# Patient Record
Sex: MALE | Race: ASIAN | NOT HISPANIC OR LATINO | Employment: OTHER | URBAN - METROPOLITAN AREA
[De-identification: names, ages, dates, MRNs, and addresses within clinical notes are randomized per-mention and may not be internally consistent; named-entity substitution may affect disease eponyms.]

---

## 2017-06-30 ENCOUNTER — RECORDS - HEALTHEAST (OUTPATIENT)
Dept: LAB | Facility: CLINIC | Age: 62
End: 2017-06-30

## 2017-06-30 LAB
CHOLEST SERPL-MCNC: 163 MG/DL
FASTING STATUS PATIENT QL REPORTED: YES
HDLC SERPL-MCNC: 30 MG/DL
LDLC SERPL CALC-MCNC: 50 MG/DL
LDLC SERPL CALC-MCNC: ABNORMAL MG/DL
PSA SERPL-MCNC: 1.2 NG/ML (ref 0–4.5)
TRIGL SERPL-MCNC: 474 MG/DL

## 2017-10-27 ENCOUNTER — RECORDS - HEALTHEAST (OUTPATIENT)
Dept: LAB | Facility: CLINIC | Age: 62
End: 2017-10-27

## 2017-10-28 LAB
1ST CONSTITUENT:: NORMAL
SOURCE: NORMAL

## 2018-03-08 ENCOUNTER — RECORDS - HEALTHEAST (OUTPATIENT)
Dept: LAB | Facility: CLINIC | Age: 63
End: 2018-03-08

## 2018-03-08 LAB
CHOLEST SERPL-MCNC: 163 MG/DL
FASTING STATUS PATIENT QL REPORTED: ABNORMAL
HDLC SERPL-MCNC: 38 MG/DL
LDLC SERPL CALC-MCNC: 59 MG/DL
TRIGL SERPL-MCNC: 332 MG/DL

## 2018-05-04 ENCOUNTER — RECORDS - HEALTHEAST (OUTPATIENT)
Dept: LAB | Facility: CLINIC | Age: 63
End: 2018-05-04

## 2018-05-04 LAB
FASTING STATUS PATIENT QL REPORTED: NORMAL
GLUCOSE BLD-MCNC: 111 MG/DL (ref 70–125)
PSA SERPL-MCNC: 1.6 NG/ML (ref 0–4.5)
TSH SERPL DL<=0.005 MIU/L-ACNC: 3.65 UIU/ML (ref 0.3–5)

## 2019-05-17 ENCOUNTER — RECORDS - HEALTHEAST (OUTPATIENT)
Dept: LAB | Facility: CLINIC | Age: 64
End: 2019-05-17

## 2019-05-17 LAB
ANION GAP SERPL CALCULATED.3IONS-SCNC: 12 MMOL/L (ref 5–18)
BUN SERPL-MCNC: 12 MG/DL (ref 8–22)
CALCIUM SERPL-MCNC: 9.3 MG/DL (ref 8.5–10.5)
CHLORIDE BLD-SCNC: 106 MMOL/L (ref 98–107)
CHOLEST SERPL-MCNC: 146 MG/DL
CO2 SERPL-SCNC: 25 MMOL/L (ref 22–31)
CREAT SERPL-MCNC: 0.97 MG/DL (ref 0.7–1.3)
FASTING STATUS PATIENT QL REPORTED: ABNORMAL
GFR SERPL CREATININE-BSD FRML MDRD: >60 ML/MIN/1.73M2
GLUCOSE BLD-MCNC: 105 MG/DL (ref 70–125)
HDLC SERPL-MCNC: 37 MG/DL
LDLC SERPL CALC-MCNC: 58 MG/DL
POTASSIUM BLD-SCNC: 4.1 MMOL/L (ref 3.5–5)
PSA SERPL-MCNC: 1.6 NG/ML (ref 0–4.5)
SODIUM SERPL-SCNC: 143 MMOL/L (ref 136–145)
TRIGL SERPL-MCNC: 257 MG/DL

## 2019-05-19 LAB — HBA1C MFR BLD: 6.1 % (ref 4.2–6.1)

## 2020-01-29 ENCOUNTER — RECORDS - HEALTHEAST (OUTPATIENT)
Dept: LAB | Facility: CLINIC | Age: 65
End: 2020-01-29

## 2020-01-29 LAB
ANION GAP SERPL CALCULATED.3IONS-SCNC: 10 MMOL/L (ref 5–18)
BUN SERPL-MCNC: 15 MG/DL (ref 8–22)
CALCIUM SERPL-MCNC: 9.2 MG/DL (ref 8.5–10.5)
CHLORIDE BLD-SCNC: 103 MMOL/L (ref 98–107)
CO2 SERPL-SCNC: 26 MMOL/L (ref 22–31)
CREAT SERPL-MCNC: 1.01 MG/DL (ref 0.7–1.3)
GFR SERPL CREATININE-BSD FRML MDRD: >60 ML/MIN/1.73M2
GLUCOSE BLD-MCNC: 98 MG/DL (ref 70–125)
POTASSIUM BLD-SCNC: 4.4 MMOL/L (ref 3.5–5)
SODIUM SERPL-SCNC: 139 MMOL/L (ref 136–145)

## 2020-01-30 LAB — BACTERIA SPEC CULT: NO GROWTH

## 2020-03-23 ENCOUNTER — RECORDS - HEALTHEAST (OUTPATIENT)
Dept: LAB | Facility: CLINIC | Age: 65
End: 2020-03-23

## 2020-03-23 LAB — INR PPP: 1.04 (ref 0.9–1.1)

## 2020-03-24 ENCOUNTER — RECORDS - HEALTHEAST (OUTPATIENT)
Dept: LAB | Facility: CLINIC | Age: 65
End: 2020-03-24

## 2020-03-24 LAB
BASOPHILS # BLD AUTO: 0 THOU/UL (ref 0–0.2)
BASOPHILS NFR BLD AUTO: 0 % (ref 0–2)
EOSINOPHIL # BLD AUTO: 0.1 THOU/UL (ref 0–0.4)
EOSINOPHIL NFR BLD AUTO: 2 % (ref 0–6)
ERYTHROCYTE [DISTWIDTH] IN BLOOD BY AUTOMATED COUNT: 13.9 % (ref 11–14.5)
HCT VFR BLD AUTO: 46.7 % (ref 40–54)
HGB BLD-MCNC: 14.4 G/DL (ref 14–18)
LYMPHOCYTES # BLD AUTO: 1.4 THOU/UL (ref 0.8–4.4)
LYMPHOCYTES NFR BLD AUTO: 27 % (ref 20–40)
MCH RBC QN AUTO: 28.2 PG (ref 27–34)
MCHC RBC AUTO-ENTMCNC: 30.8 G/DL (ref 32–36)
MCV RBC AUTO: 92 FL (ref 80–100)
MONOCYTES # BLD AUTO: 0.3 THOU/UL (ref 0–0.9)
MONOCYTES NFR BLD AUTO: 5 % (ref 2–10)
NEUTROPHILS # BLD AUTO: 3.3 THOU/UL (ref 2–7.7)
NEUTROPHILS NFR BLD AUTO: 65 % (ref 50–70)
PLATELET # BLD AUTO: 284 THOU/UL (ref 140–440)
PMV BLD AUTO: 10.3 FL (ref 8.5–12.5)
RBC # BLD AUTO: 5.1 MILL/UL (ref 4.4–6.2)
WBC: 5 THOU/UL (ref 4–11)

## 2020-07-08 ENCOUNTER — RECORDS - HEALTHEAST (OUTPATIENT)
Dept: LAB | Facility: CLINIC | Age: 65
End: 2020-07-08

## 2020-07-08 LAB
ANION GAP SERPL CALCULATED.3IONS-SCNC: 10 MMOL/L (ref 5–18)
BUN SERPL-MCNC: 16 MG/DL (ref 8–22)
CALCIUM SERPL-MCNC: 9.3 MG/DL (ref 8.5–10.5)
CHLORIDE BLD-SCNC: 104 MMOL/L (ref 98–107)
CHOLEST SERPL-MCNC: 150 MG/DL
CO2 SERPL-SCNC: 26 MMOL/L (ref 22–31)
CREAT SERPL-MCNC: 0.97 MG/DL (ref 0.7–1.3)
FASTING STATUS PATIENT QL REPORTED: NORMAL
GFR SERPL CREATININE-BSD FRML MDRD: >60 ML/MIN/1.73M2
GLUCOSE BLD-MCNC: 108 MG/DL (ref 70–125)
HDLC SERPL-MCNC: 40 MG/DL
LDLC SERPL CALC-MCNC: 81 MG/DL
POTASSIUM BLD-SCNC: 3.7 MMOL/L (ref 3.5–5)
PSA SERPL-MCNC: 1.2 NG/ML (ref 0–4.5)
SODIUM SERPL-SCNC: 140 MMOL/L (ref 136–145)
TRIGL SERPL-MCNC: 145 MG/DL
TSH SERPL DL<=0.005 MIU/L-ACNC: 3.83 UIU/ML (ref 0.3–5)

## 2021-05-25 ENCOUNTER — RECORDS - HEALTHEAST (OUTPATIENT)
Dept: ADMINISTRATIVE | Facility: CLINIC | Age: 66
End: 2021-05-25

## 2021-05-28 ENCOUNTER — RECORDS - HEALTHEAST (OUTPATIENT)
Dept: ADMINISTRATIVE | Facility: CLINIC | Age: 66
End: 2021-05-28

## 2021-06-02 ENCOUNTER — RECORDS - HEALTHEAST (OUTPATIENT)
Dept: ADMINISTRATIVE | Facility: CLINIC | Age: 66
End: 2021-06-02

## 2021-06-25 ENCOUNTER — RECORDS - HEALTHEAST (OUTPATIENT)
Dept: LAB | Facility: CLINIC | Age: 66
End: 2021-06-25

## 2021-06-25 LAB
ANION GAP SERPL CALCULATED.3IONS-SCNC: 7 MMOL/L (ref 5–18)
BUN SERPL-MCNC: 13 MG/DL (ref 8–22)
CALCIUM SERPL-MCNC: 8.6 MG/DL (ref 8.5–10.5)
CHLORIDE BLD-SCNC: 104 MMOL/L (ref 98–107)
CHOLEST SERPL-MCNC: 139 MG/DL
CO2 SERPL-SCNC: 28 MMOL/L (ref 22–31)
CREAT SERPL-MCNC: 0.96 MG/DL (ref 0.7–1.3)
FASTING STATUS PATIENT QL REPORTED: ABNORMAL
GFR SERPL CREATININE-BSD FRML MDRD: >60 ML/MIN/1.73M2
GLUCOSE BLD-MCNC: 102 MG/DL (ref 70–125)
HDLC SERPL-MCNC: 38 MG/DL
LDLC SERPL CALC-MCNC: 61 MG/DL
POTASSIUM BLD-SCNC: 4.2 MMOL/L (ref 3.5–5)
PSA SERPL-MCNC: 1.3 NG/ML (ref 0–4.5)
SODIUM SERPL-SCNC: 139 MMOL/L (ref 136–145)
TRIGL SERPL-MCNC: 199 MG/DL

## 2022-06-28 ENCOUNTER — LAB REQUISITION (OUTPATIENT)
Dept: LAB | Facility: CLINIC | Age: 67
End: 2022-06-28
Payer: MEDICARE

## 2022-06-28 DIAGNOSIS — E78.5 HYPERLIPIDEMIA, UNSPECIFIED: ICD-10-CM

## 2022-06-28 DIAGNOSIS — Z12.5 ENCOUNTER FOR SCREENING FOR MALIGNANT NEOPLASM OF PROSTATE: ICD-10-CM

## 2022-06-28 LAB
ALBUMIN SERPL-MCNC: 3.8 G/DL (ref 3.5–5)
ALP SERPL-CCNC: 105 U/L (ref 45–120)
ALT SERPL W P-5'-P-CCNC: 44 U/L (ref 0–45)
ANION GAP SERPL CALCULATED.3IONS-SCNC: 11 MMOL/L (ref 5–18)
AST SERPL W P-5'-P-CCNC: 37 U/L (ref 0–40)
BILIRUB SERPL-MCNC: 1.1 MG/DL (ref 0–1)
BUN SERPL-MCNC: 13 MG/DL (ref 8–22)
CALCIUM SERPL-MCNC: 9.1 MG/DL (ref 8.5–10.5)
CHLORIDE BLD-SCNC: 104 MMOL/L (ref 98–107)
CHOLEST SERPL-MCNC: 164 MG/DL
CO2 SERPL-SCNC: 25 MMOL/L (ref 22–31)
CREAT SERPL-MCNC: 0.67 MG/DL (ref 0.7–1.3)
FASTING STATUS PATIENT QL REPORTED: ABNORMAL
GFR SERPL CREATININE-BSD FRML MDRD: >90 ML/MIN/1.73M2
GLUCOSE BLD-MCNC: 110 MG/DL (ref 70–125)
HDLC SERPL-MCNC: 40 MG/DL
LDLC SERPL CALC-MCNC: 64 MG/DL
POTASSIUM BLD-SCNC: 4 MMOL/L (ref 3.5–5)
PROT SERPL-MCNC: 7 G/DL (ref 6–8)
PSA SERPL-MCNC: 1.26 UG/L (ref 0–4.5)
SODIUM SERPL-SCNC: 140 MMOL/L (ref 136–145)
TRIGL SERPL-MCNC: 299 MG/DL

## 2022-06-28 PROCEDURE — 80053 COMPREHEN METABOLIC PANEL: CPT | Mod: ORL | Performed by: FAMILY MEDICINE

## 2022-06-28 PROCEDURE — 80061 LIPID PANEL: CPT | Mod: ORL | Performed by: FAMILY MEDICINE

## 2022-06-28 PROCEDURE — G0103 PSA SCREENING: HCPCS | Mod: ORL | Performed by: FAMILY MEDICINE

## 2023-05-04 ENCOUNTER — LAB REQUISITION (OUTPATIENT)
Dept: LAB | Facility: CLINIC | Age: 68
End: 2023-05-04
Payer: MEDICARE

## 2023-05-04 ENCOUNTER — TRANSFERRED RECORDS (OUTPATIENT)
Dept: HEALTH INFORMATION MANAGEMENT | Facility: CLINIC | Age: 68
End: 2023-05-04

## 2023-05-04 DIAGNOSIS — R31.9 HEMATURIA, UNSPECIFIED: ICD-10-CM

## 2023-05-04 LAB
ALBUMIN UR-MCNC: 50 MG/DL
APPEARANCE UR: ABNORMAL
BILIRUB UR QL STRIP: NEGATIVE
CAOX CRY #/AREA URNS HPF: ABNORMAL /HPF
COLOR UR AUTO: ABNORMAL
GLUCOSE UR STRIP-MCNC: NEGATIVE MG/DL
HGB UR QL STRIP: ABNORMAL
KETONES UR STRIP-MCNC: NEGATIVE MG/DL
LEUKOCYTE ESTERASE UR QL STRIP: NEGATIVE
MUCOUS THREADS #/AREA URNS LPF: PRESENT /LPF
NITRATE UR QL: NEGATIVE
PH UR STRIP: 6 [PH] (ref 5–7)
RBC URINE: >182 /HPF
SP GR UR STRIP: 1.02 (ref 1–1.03)
SQUAMOUS EPITHELIAL: 1 /HPF
UROBILINOGEN UR STRIP-MCNC: NORMAL MG/DL
WBC URINE: 7 /HPF

## 2023-05-04 PROCEDURE — 81001 URINALYSIS AUTO W/SCOPE: CPT | Mod: ORL | Performed by: FAMILY MEDICINE

## 2023-05-04 PROCEDURE — 87086 URINE CULTURE/COLONY COUNT: CPT | Mod: ORL | Performed by: FAMILY MEDICINE

## 2023-05-05 ENCOUNTER — ANCILLARY PROCEDURE (OUTPATIENT)
Dept: RADIOLOGY | Facility: CLINIC | Age: 68
End: 2023-05-05
Payer: MEDICARE

## 2023-05-06 LAB — BACTERIA UR CULT: NO GROWTH

## 2023-05-08 ENCOUNTER — TRANSFERRED RECORDS (OUTPATIENT)
Dept: HEALTH INFORMATION MANAGEMENT | Facility: CLINIC | Age: 68
End: 2023-05-08
Payer: MEDICARE

## 2023-05-08 DIAGNOSIS — R69 DIAGNOSIS UNKNOWN: Primary | ICD-10-CM

## 2023-05-11 ENCOUNTER — TELEPHONE (OUTPATIENT)
Dept: UROLOGY | Facility: CLINIC | Age: 68
End: 2023-05-11
Payer: MEDICARE

## 2023-05-11 NOTE — TELEPHONE ENCOUNTER
Message left for patient to call back to reschedule.  Can be virtual visit.  Alondra Montez RN

## 2023-06-07 ENCOUNTER — VIRTUAL VISIT (OUTPATIENT)
Dept: UROLOGY | Facility: CLINIC | Age: 68
End: 2023-06-07
Payer: MEDICARE

## 2023-06-07 DIAGNOSIS — N20.1 URETERAL STONE: Primary | ICD-10-CM

## 2023-06-07 DIAGNOSIS — N13.2 URETERAL STONE WITH HYDRONEPHROSIS: ICD-10-CM

## 2023-06-07 PROCEDURE — 99204 OFFICE O/P NEW MOD 45 MIN: CPT | Mod: VID | Performed by: STUDENT IN AN ORGANIZED HEALTH CARE EDUCATION/TRAINING PROGRAM

## 2023-06-07 RX ORDER — FLUTICASONE PROPIONATE 50 MCG
SPRAY, SUSPENSION (ML) NASAL
COMMUNITY

## 2023-06-07 RX ORDER — FEXOFENADINE HCL 180 MG/1
TABLET ORAL
COMMUNITY

## 2023-06-07 RX ORDER — TAMSULOSIN HYDROCHLORIDE 0.4 MG/1
1 CAPSULE ORAL
COMMUNITY
Start: 2023-05-08 | End: 2023-06-14

## 2023-06-07 RX ORDER — FAMOTIDINE 20 MG
2000 TABLET ORAL
COMMUNITY

## 2023-06-07 RX ORDER — ALBUTEROL SULFATE 90 UG/1
AEROSOL, METERED RESPIRATORY (INHALATION)
COMMUNITY

## 2023-06-07 NOTE — PROGRESS NOTES
Juan Luis is a 68 year old who is being evaluated via a billable phone visit  Phone call duration: 7 minutes        Chief Complaint:    Kidney/Ureteral Stone             History of Present Illness:   Juan Luis Mims is a 68 year old male being seen for ureteral stone.  Duration of problem: 1 month  Previous treatments: none      Reviewed previous notes from Manjit Lainez    Juan Luis presents today for evaluation of ureteral stone identified.  He was seen by his primary care doctor for history of hematuria and discomfort  Identified as a ureteral stone here for follow-up  His pain and hematuria has significantly reduced  Does not recall passing the ureteral stone in the interim  No prior urological procedures             Past Medical History:   No past medical history on file.         Past Surgical History:   No past surgical history on file.         Medications     Current Outpatient Medications   Medication     atorvastatin (LIPITOR) 20 MG tablet     fexofenadine (ALLEGRA ALLERGY) 180 MG tablet     fluticasone (FLONASE ALLERGY RELIEF) 50 MCG/ACT nasal spray     latanoprost (XALATAN) 0.005 % ophthalmic solution     Multiple Vitamin (MULTI VITAMIN) TABS     tamsulosin (FLOMAX) 0.4 MG capsule     Vitamin D, Cholecalciferol, 25 MCG (1000 UT) CAPS     albuterol (PROAIR HFA) 108 (90 Base) MCG/ACT inhaler     No current facility-administered medications for this visit.            Family History:   No family history on file.         Social History:     Social History     Socioeconomic History     Marital status:      Spouse name: Not on file     Number of children: Not on file     Years of education: Not on file     Highest education level: Not on file   Occupational History     Not on file   Tobacco Use     Smoking status: Not on file     Smokeless tobacco: Not on file   Substance and Sexual Activity     Alcohol use: Not on file     Drug use: Not on file     Sexual activity: Not on file   Other Topics Concern     Not  on file   Social History Narrative     Not on file     Social Determinants of Health     Financial Resource Strain: Not on file   Food Insecurity: Not on file   Transportation Needs: Not on file   Physical Activity: Not on file   Stress: Not on file   Social Connections: Not on file   Intimate Partner Violence: Not on file   Housing Stability: Not on file            Allergies:   Patient has no known allergies.         Review of Systems:  From intake questionnaire     Skin: negative  Eyes: negative  Ears/Nose/Throat: negative  Respiratory: No shortness of breath, dyspnea on exertion, cough, or hemoptysis  Cardiovascular: No chest pain or palpitations  Gastrointestinal: negative; no nausea/vomiting, constipation or diarrhea  Genitourinary: as per HPI  Musculoskeletal: negative  Neurologic: negative  Psychiatric: negative  Hematologic/Lymphatic/Immunologic: negative  Endocrine: negative         Physical Exam:   This is a virtual phone visit      Review of Imaging:  The following imaging exams were independently viewed and interpreted by me and discussed with patient:  CT Scan Abd/Pelvis: Abnormal: Left mid ureteral calculus around 4 to 5 mm in size    Review of Labs:  The following labs were reviewed by me and discussed with the patient:  UA: Abnormal: Gross hematuria  Urine Culture: Normal    Assessment & Plan       Ureteral stone  Discussed about the significance of the ureteral stone and the likelihood of the stone passing on its own  Advised him to continue on overall tamsulosin to help with the stone passage  We will order a CT be done in the next week or so to evaluate the stone passed is not having any significant symptoms at this point  May need to consider ureteroscopy if stone not passed  If passes the stone he may still need a cystoscopy in view of the gross hematuria and his age    - CT Abdomen Pelvis w/o Contrast [AZN307]; Future      Jac Doherty MD  [facility]      ==========================    Additional Billing and Coding Information:  Review of external notes as documented above   Review of the result(s) of each unique test - Urine culture UA, CT abdomen pelvis    Independent interpretation of a test performed by another physician/other qualified health care professional (not separately reported) -       Discussion of management or test interpretation with external physician/other qualified healthcare professional/appropriate source -           12 minutes spent by me on the date of the encounter doing chart review, review of test results, interpretation of tests, patient visit and documentation

## 2023-06-07 NOTE — PROGRESS NOTES
Patient states that they are in [place] at the time of their visit.  Patient is aware telehealth visit is billable and agrees to proceed.  Alondra Montez RN

## 2023-06-13 ENCOUNTER — HOSPITAL ENCOUNTER (OUTPATIENT)
Dept: CT IMAGING | Facility: HOSPITAL | Age: 68
Discharge: HOME | End: 2023-06-13
Attending: STUDENT IN AN ORGANIZED HEALTH CARE EDUCATION/TRAINING PROGRAM | Admitting: STUDENT IN AN ORGANIZED HEALTH CARE EDUCATION/TRAINING PROGRAM
Payer: MEDICARE

## 2023-06-13 DIAGNOSIS — N20.1 URETERAL STONE: ICD-10-CM

## 2023-06-13 PROCEDURE — G1010 CDSM STANSON: HCPCS

## 2023-06-14 DIAGNOSIS — N20.1 URETERAL STONE: Primary | ICD-10-CM

## 2023-06-14 RX ORDER — TAMSULOSIN HYDROCHLORIDE 0.4 MG/1
0.4 CAPSULE ORAL DAILY
Qty: 30 CAPSULE | Refills: 0 | Status: SHIPPED | OUTPATIENT
Start: 2023-06-14 | End: 2023-07-07

## 2023-06-14 NOTE — CONFIDENTIAL NOTE
I called Juan Luis and discussed his recent CT  He has a 4 mm stone in the distal ureter which is probably on its way out  He is okay with medical expulsive therapy and I have given him a 30-day prescription of tamsulosin if he does not document stone passage in the next 4 weeks we will have him get a pelvic CT done.  Discussed about possible ureteroscopy  Jac Doherty MD

## 2023-06-15 ENCOUNTER — LAB REQUISITION (OUTPATIENT)
Dept: LAB | Facility: CLINIC | Age: 68
End: 2023-06-15
Payer: MEDICARE

## 2023-06-15 DIAGNOSIS — E78.5 HYPERLIPIDEMIA, UNSPECIFIED: ICD-10-CM

## 2023-06-15 DIAGNOSIS — Z12.5 ENCOUNTER FOR SCREENING FOR MALIGNANT NEOPLASM OF PROSTATE: ICD-10-CM

## 2023-06-15 PROCEDURE — 83721 ASSAY OF BLOOD LIPOPROTEIN: CPT | Mod: ORL,91 | Performed by: FAMILY MEDICINE

## 2023-06-15 PROCEDURE — G0103 PSA SCREENING: HCPCS | Mod: ORL | Performed by: FAMILY MEDICINE

## 2023-06-15 PROCEDURE — 80053 COMPREHEN METABOLIC PANEL: CPT | Mod: ORL | Performed by: FAMILY MEDICINE

## 2023-06-15 PROCEDURE — 80061 LIPID PANEL: CPT | Mod: ORL | Performed by: FAMILY MEDICINE

## 2023-06-16 LAB
ALBUMIN SERPL BCG-MCNC: 4.2 G/DL (ref 3.5–5.2)
ALP SERPL-CCNC: 99 U/L (ref 40–129)
ALT SERPL W P-5'-P-CCNC: 61 U/L (ref 0–70)
ANION GAP SERPL CALCULATED.3IONS-SCNC: 16 MMOL/L (ref 7–15)
AST SERPL W P-5'-P-CCNC: 45 U/L (ref 0–45)
BILIRUB SERPL-MCNC: 0.7 MG/DL
BUN SERPL-MCNC: 15.9 MG/DL (ref 8–23)
CALCIUM SERPL-MCNC: 9.1 MG/DL (ref 8.8–10.2)
CHLORIDE SERPL-SCNC: 100 MMOL/L (ref 98–107)
CHOLEST SERPL-MCNC: 181 MG/DL
CREAT SERPL-MCNC: 1.18 MG/DL (ref 0.67–1.17)
DEPRECATED HCO3 PLAS-SCNC: 23 MMOL/L (ref 22–29)
GFR SERPL CREATININE-BSD FRML MDRD: 68 ML/MIN/1.73M2
GLUCOSE SERPL-MCNC: 102 MG/DL (ref 70–99)
HDLC SERPL-MCNC: 34 MG/DL
LDLC SERPL CALC-MCNC: ABNORMAL MG/DL
LDLC SERPL DIRECT ASSAY-MCNC: 76 MG/DL
NONHDLC SERPL-MCNC: 147 MG/DL
POTASSIUM SERPL-SCNC: 4.4 MMOL/L (ref 3.4–5.3)
PROT SERPL-MCNC: 7.2 G/DL (ref 6.4–8.3)
PSA SERPL DL<=0.01 NG/ML-MCNC: 1.39 NG/ML (ref 0–4.5)
SODIUM SERPL-SCNC: 139 MMOL/L (ref 136–145)
TRIGL SERPL-MCNC: 702 MG/DL

## 2023-06-21 ENCOUNTER — HOSPITAL ENCOUNTER (OUTPATIENT)
Dept: CARDIOLOGY | Facility: HOSPITAL | Age: 68
Discharge: HOME | End: 2023-06-21
Attending: FAMILY MEDICINE | Admitting: FAMILY MEDICINE
Payer: MEDICARE

## 2023-06-21 DIAGNOSIS — R00.2 PALPITATIONS: ICD-10-CM

## 2023-06-21 PROCEDURE — 93270 REMOTE 30 DAY ECG REV/REPORT: CPT

## 2023-07-07 ENCOUNTER — TELEPHONE (OUTPATIENT)
Dept: UROLOGY | Facility: CLINIC | Age: 68
End: 2023-07-07
Payer: MEDICARE

## 2023-07-07 DIAGNOSIS — N20.1 URETERAL STONE: ICD-10-CM

## 2023-07-07 RX ORDER — TAMSULOSIN HYDROCHLORIDE 0.4 MG/1
0.4 CAPSULE ORAL DAILY
Qty: 30 CAPSULE | Refills: 0 | Status: SHIPPED | OUTPATIENT
Start: 2023-07-07 | End: 2023-07-19

## 2023-07-12 ENCOUNTER — TELEPHONE (OUTPATIENT)
Dept: UROLOGY | Facility: CLINIC | Age: 68
End: 2023-07-12
Payer: MEDICARE

## 2023-07-12 ENCOUNTER — LAB (OUTPATIENT)
Dept: LAB | Facility: HOSPITAL | Age: 68
End: 2023-07-12
Payer: MEDICARE

## 2023-07-12 DIAGNOSIS — N20.1 URETERAL STONE: Primary | ICD-10-CM

## 2023-07-12 DIAGNOSIS — N20.1 URETERAL STONE: ICD-10-CM

## 2023-07-12 PROCEDURE — 82365 CALCULUS SPECTROSCOPY: CPT

## 2023-07-12 NOTE — TELEPHONE ENCOUNTER
[facility] Call Center    Phone Message    May a detailed message be left on voicemail: yes     Reason for Call: Other: .  PT calling stating he just past a stone this morning and is wanting to know should he continue medication also if he should bring stone in to get analyzed. PT has an upcoming appt with Chas also. Please call pt     Action Taken: Message routed to:  Other: [facility]    Travel Screening: Not Applicable

## 2023-07-12 NOTE — TELEPHONE ENCOUNTER
Returned patient's central call message.  He is congratulated on passing his stone.  He is feeling well.  Stone analysis order was entered in.  His upcoming CT was cancelled.  Patient to discontinue flomax since he passed his stone.  Patient was encouraged to keep telephone visit appt on 7/19/23 to go over results with provider and touch base on possible cystoscopy for gross hematuria as mentioned on last encounter.

## 2023-07-13 PROCEDURE — 93228 REMOTE 30 DAY ECG REV/REPORT: CPT | Performed by: INTERNAL MEDICINE

## 2023-07-16 LAB
APPEARANCE STONE: NORMAL
COMPN STONE: NORMAL
SPECIMEN WT: 58 MG

## 2023-07-19 ENCOUNTER — VIRTUAL VISIT (OUTPATIENT)
Dept: UROLOGY | Facility: CLINIC | Age: 68
End: 2023-07-19
Payer: MEDICARE

## 2023-07-19 DIAGNOSIS — Z87.448 HISTORY OF HEMATURIA: ICD-10-CM

## 2023-07-19 DIAGNOSIS — N20.1 URETERAL STONE: Primary | ICD-10-CM

## 2023-07-19 PROCEDURE — 99214 OFFICE O/P EST MOD 30 MIN: CPT | Mod: 95 | Performed by: STUDENT IN AN ORGANIZED HEALTH CARE EDUCATION/TRAINING PROGRAM

## 2023-07-19 ASSESSMENT — PAIN SCALES - GENERAL: PAINLEVEL: NO PAIN (0)

## 2023-07-19 NOTE — PROGRESS NOTES
Patient is roomed via telephone for a telehealth visit.  Patient confirmed he is in the [facility] at the time of this appointment.  Patient understand that this visit is billable and agree to proceed with appointment.

## 2023-07-19 NOTE — PROGRESS NOTES
Juan Luis is a 68 year old who is being evaluated via a billable telephone visit.      Phone call duration: 7 minutes        Chief Complaint:    Kidney/Ureteral Stone  History of gross hematuria           History of Present Illness:   Juan Luis Mims is a 68 year old male being seen for follow-up.  Duration of problem: 2 months  Previous treatments: Medical expulsive therapy      Reviewed previous notes     Juan Luis passed the ureteral stone 2 weeks ago  He is back with his stone analysis and further plans for evaluation for his gross hematuria             Past Medical History:     Past Medical History:   Diagnosis Date     Kidney stone 06/13/2023            Past Surgical History:   No past surgical history on file.         Medications     Current Outpatient Medications   Medication     albuterol (PROAIR HFA) 108 (90 Base) MCG/ACT inhaler     atorvastatin (LIPITOR) 20 MG tablet     fexofenadine (ALLEGRA ALLERGY) 180 MG tablet     fluticasone (FLONASE ALLERGY RELIEF) 50 MCG/ACT nasal spray     latanoprost (XALATAN) 0.005 % ophthalmic solution     Multiple Vitamin (MULTI VITAMIN) TABS     Vitamin D, Cholecalciferol, 25 MCG (1000 UT) CAPS     No current facility-administered medications for this visit.            Family History:   No family history on file.         Social History:     Social History     Socioeconomic History     Marital status:      Spouse name: Not on file     Number of children: Not on file     Years of education: Not on file     Highest education level: Not on file   Occupational History     Not on file   Tobacco Use     Smoking status: Not on file     Smokeless tobacco: Not on file   Substance and Sexual Activity     Alcohol use: Not on file     Drug use: Not on file     Sexual activity: Not on file   Other Topics Concern     Not on file   Social History Narrative     Not on file     Social Determinants of Health     Financial Resource Strain: Not on file   Food Insecurity: Not on file    Transportation Needs: Not on file   Physical Activity: Not on file   Stress: Not on file   Social Connections: Not on file   Intimate Partner Violence: Not on file   Housing Stability: Not on file            Allergies:   Patient has no known allergies.         Review of Systems:  From intake questionnaire     Skin: negative  Eyes: negative  Ears/Nose/Throat: negative  Respiratory: No shortness of breath, dyspnea on exertion, cough, or hemoptysis  Cardiovascular: No chest pain or palpitations  Gastrointestinal: negative; no nausea/vomiting, constipation or diarrhea  Genitourinary: as per HPI  Musculoskeletal: negative  Neurologic: negative  Psychiatric: negative  Hematologic/Lymphatic/Immunologic: negative  Endocrine: negative         Physical Exam:   This is a virtual phone visit      Review of Imaging:  The following imaging exams were independently viewed and interpreted by me and discussed with patient:      Review of Labs:  The following labs were reviewed by me and discussed with the patient:  Stone analysis: Abnormal: Calcium oxalate monohydrate and dihydrate    Assessment & Plan     Ureteral stone  Recommend follow-up in 3 months with ultrasound kidneys  At this point in time with a first-time stone former I do not think he needs any significant metabolic work-up  We discussed about oxalate stones and prevention of those with dietary measures4  - US Renal Complete Non-Vascular; Future    History of hematuria  He has a history of gross hematuria  He is 62 years old and therefore is likely related to get a cystoscopy done to rule out any stone centimeters size 2 years that can still be possible normal bladder CT  We will have the office base with him regarding his        Jac Doherty MD  [facility]      ==========================    Additional Billing and Coding Information:  Review of external notes as documented above   Review of the result(s) of each unique test  - Stone analysis    Independent interpretation of a test performed by another physician/other qualified health care professional (not separately reported) -       Discussion of management or test interpretation with external physician/other qualified healthcare professional/appropriate source -           12 minutes spent by me on the date of the encounter doing chart review, review of test results, interpretation of tests, patient visit and documentation

## 2023-10-18 ENCOUNTER — HOSPITAL ENCOUNTER (OUTPATIENT)
Dept: ULTRASOUND IMAGING | Facility: HOSPITAL | Age: 68
Discharge: HOME | End: 2023-10-18
Attending: STUDENT IN AN ORGANIZED HEALTH CARE EDUCATION/TRAINING PROGRAM | Admitting: STUDENT IN AN ORGANIZED HEALTH CARE EDUCATION/TRAINING PROGRAM
Payer: MEDICARE

## 2023-10-18 DIAGNOSIS — N20.1 URETERAL STONE: ICD-10-CM

## 2023-10-18 PROCEDURE — 76770 US EXAM ABDO BACK WALL COMP: CPT

## 2023-11-01 ENCOUNTER — OFFICE VISIT (OUTPATIENT)
Dept: UROLOGY | Facility: CLINIC | Age: 68
End: 2023-11-01
Payer: MEDICARE

## 2023-11-01 VITALS — DIASTOLIC BLOOD PRESSURE: 82 MMHG | HEART RATE: 94 BPM | SYSTOLIC BLOOD PRESSURE: 158 MMHG | TEMPERATURE: 97.8 F

## 2023-11-01 DIAGNOSIS — Z87.448 HISTORY OF HEMATURIA: Primary | ICD-10-CM

## 2023-11-01 PROCEDURE — 52000 CYSTOURETHROSCOPY: CPT | Performed by: STUDENT IN AN ORGANIZED HEALTH CARE EDUCATION/TRAINING PROGRAM

## 2023-11-01 PROCEDURE — 99213 OFFICE O/P EST LOW 20 MIN: CPT | Mod: 25 | Performed by: STUDENT IN AN ORGANIZED HEALTH CARE EDUCATION/TRAINING PROGRAM

## 2023-11-01 ASSESSMENT — PAIN SCALES - GENERAL: PAINLEVEL: NO PAIN (0)

## 2023-11-01 NOTE — PATIENT INSTRUCTIONS
"AFTER YOUR CYSTOSCOPY        You have just completed a cystoscopy, or \"cysto\", which allowed your physician to learn more about your bladder (or to remove a stent placed after surgery). We suggest that you continue to avoid caffeine, fruit juice, and alcohol for the next 24 hours, however, you are encouraged to return to your normal activities.         A few things that are considered normal after your cystoscopy:     * Small amount of bleeding (or spotting) that clears within the next 24 hours     * Slight burning sensation with urination     * Sensation to of needing to avoid more frequently     * The feeling of \"air\" in your urine     * Mild discomfort that is relieved with Tylenol        Please contact our office promptly if you:     * Develop a fever above 101 degrees     * Are unable to urinate     * Develop bright red blood that does not stop     * Severe pain or swelling         Please contact our office with any concerns or questions @[facility].      Renal US to be done in 1 year  We will call back with results    "

## 2023-11-01 NOTE — PROGRESS NOTES
Patient educated regarding cystoscopy procedure and possible symptoms after completion.  Patient voiced understanding of information.  Handout given to patient.  Consent form signed.

## 2023-11-01 NOTE — PROGRESS NOTES
CHIEF COMPLAINT   It was my pleasure to see Juan Luis Mims who is a 68 year old male for ureteral stone and history of hematuria.      HPI:  Juan Luis Mims is a 68 year old male being seen for follow-up .  Duration of problem: Few months  Previous treatments: None      Reviewed previous notes  No further episodes of abdominal or flank pain  No reported history of hematuria since    Exam:  BP (!) 158/82 (BP Location: Right arm, Patient Position: Sitting, Cuff Size: Adult Regular)   Pulse 94   Temp 97.8  F (36.6  C) (Tympanic)   General: age-appropriate appearing male in NAD sitting in an exam chair  Resp: no respiratory distress  CV: heart rate regular  Abdomen: Degree of obesity is mild. Abdomen is soft and nontender. No organomegaly.   :  Deferred to cystoscopy  Neuro: grossly non focal. Normal reflexes  Motor: excellent strength throughout      Cystoscopy  Pre-procedure diagnosis: Micro scopic hematuria  Post procedure diagnosis: normal cystoscopy  Procedure performed: cystoscopy  Surgeon: Jac Doherty MD  Anesthesia: local    Indications for procedure: Patient is a 68 year old year old male with a history of microscopic hematuria.  Here today for cysto    Description of procedure: After fully informed voluntary consent was obtained patient was brought into the procedure room, identified and placed in a supine position on the cysto table.  The groin/scrotum were prepped and draped in a sterile fashion with betadine.  A 15F flexible cystoscope was inserted into the urethra and the bladder and urethra examined in a systematic manner.  There were no tumor stones or diverticula.  Ureteric orifices were normal in position and number and effluxing clear urine.  The prostate was 4 cm long and did not show bilobar hypertrophy.  There was no median lobe.  Distal urethra was normal.  The patient tolerated the procedure well and there were no complications.      Assessment/Plan: Patient with a history of hematuria with  negative cystoscopy.  Follow-up as needed.  Review of Imaging:  The following imaging exams were independently viewed and interpreted by me and discussed with patient:  CT Scan Abd/Pelvis: Abnormal: June 2023 showed punctate stones, ureteral stone which has since passed no other lesion in the bladder or the kidneys    Review of Labs:  The following labs were reviewed by me and discussed with the patient:  UA: Abnormal: Micro scopic hematuria    Assessment & Plan     History of hematuria  No obvious evidence of any lesions in the bladder today  He can get an ultrasound kidney done in 1 year  We will send you a message on [facility] regarding the results we will give him a call  - CYSTOURETHROSCOPY  - US Renal Complete Non-Vascular; Future      Jacbridgette Doherty MD  [facility]      ==========================    Additional Billing and Coding Information:  Review of external notes as documented above   Review of the result(s) of each unique test - UA, CT                7 minutes spent by me on the date of the encounter doing chart review, review of test results, interpretation of tests, patient visit, and documentation apart from cystoscopy    ==========================

## 2024-06-18 ENCOUNTER — LAB REQUISITION (OUTPATIENT)
Dept: LAB | Facility: CLINIC | Age: 69
End: 2024-06-18
Payer: MEDICARE

## 2024-06-18 DIAGNOSIS — Z12.5 ENCOUNTER FOR SCREENING FOR MALIGNANT NEOPLASM OF PROSTATE: ICD-10-CM

## 2024-06-18 DIAGNOSIS — E78.5 HYPERLIPIDEMIA, UNSPECIFIED: ICD-10-CM

## 2024-06-18 PROCEDURE — 80053 COMPREHEN METABOLIC PANEL: CPT | Mod: ORL | Performed by: FAMILY MEDICINE

## 2024-06-18 PROCEDURE — G0103 PSA SCREENING: HCPCS | Mod: ORL | Performed by: FAMILY MEDICINE

## 2024-06-18 PROCEDURE — 80061 LIPID PANEL: CPT | Mod: ORL | Performed by: FAMILY MEDICINE

## 2024-06-19 LAB
ALBUMIN SERPL BCG-MCNC: 4.2 G/DL (ref 3.5–5.2)
ALP SERPL-CCNC: 91 U/L (ref 40–150)
ALT SERPL W P-5'-P-CCNC: 46 U/L (ref 0–70)
ANION GAP SERPL CALCULATED.3IONS-SCNC: 17 MMOL/L (ref 7–15)
AST SERPL W P-5'-P-CCNC: 37 U/L (ref 0–45)
BILIRUB SERPL-MCNC: 0.6 MG/DL
BUN SERPL-MCNC: 15.4 MG/DL (ref 8–23)
CALCIUM SERPL-MCNC: 9.2 MG/DL (ref 8.8–10.2)
CHLORIDE SERPL-SCNC: 111 MMOL/L (ref 98–107)
CHOLEST SERPL-MCNC: 153 MG/DL
CREAT SERPL-MCNC: 0.99 MG/DL (ref 0.67–1.17)
DEPRECATED HCO3 PLAS-SCNC: 25 MMOL/L (ref 22–29)
EGFRCR SERPLBLD CKD-EPI 2021: 83 ML/MIN/1.73M2
FASTING STATUS PATIENT QL REPORTED: ABNORMAL
FASTING STATUS PATIENT QL REPORTED: ABNORMAL
GLUCOSE SERPL-MCNC: 133 MG/DL (ref 70–99)
HDLC SERPL-MCNC: 36 MG/DL
LDLC SERPL CALC-MCNC: 52 MG/DL
NONHDLC SERPL-MCNC: 117 MG/DL
POTASSIUM SERPL-SCNC: 4.5 MMOL/L (ref 3.4–5.3)
PROT SERPL-MCNC: 7.3 G/DL (ref 6.4–8.3)
PSA SERPL DL<=0.01 NG/ML-MCNC: 1.32 NG/ML (ref 0–4.5)
SODIUM SERPL-SCNC: 153 MMOL/L (ref 135–145)
TRIGL SERPL-MCNC: 326 MG/DL

## 2024-07-02 ENCOUNTER — LAB REQUISITION (OUTPATIENT)
Dept: LAB | Facility: CLINIC | Age: 69
End: 2024-07-02
Payer: MEDICARE

## 2024-07-02 DIAGNOSIS — R89.9 UNSPECIFIED ABNORMAL FINDING IN SPECIMENS FROM OTHER ORGANS, SYSTEMS AND TISSUES: ICD-10-CM

## 2024-07-02 PROCEDURE — 80048 BASIC METABOLIC PNL TOTAL CA: CPT | Mod: ORL | Performed by: FAMILY MEDICINE

## 2024-07-03 LAB
ANION GAP SERPL CALCULATED.3IONS-SCNC: 9 MMOL/L (ref 7–15)
BUN SERPL-MCNC: 14.3 MG/DL (ref 8–23)
CALCIUM SERPL-MCNC: 9.2 MG/DL (ref 8.8–10.2)
CHLORIDE SERPL-SCNC: 102 MMOL/L (ref 98–107)
CREAT SERPL-MCNC: 1.01 MG/DL (ref 0.67–1.17)
DEPRECATED HCO3 PLAS-SCNC: 27 MMOL/L (ref 22–29)
EGFRCR SERPLBLD CKD-EPI 2021: 82 ML/MIN/1.73M2
GLUCOSE SERPL-MCNC: 116 MG/DL (ref 70–99)
POTASSIUM SERPL-SCNC: 4.5 MMOL/L (ref 3.4–5.3)
SODIUM SERPL-SCNC: 138 MMOL/L (ref 135–145)

## 2025-06-20 ENCOUNTER — LAB REQUISITION (OUTPATIENT)
Dept: LAB | Facility: CLINIC | Age: 70
End: 2025-06-20
Payer: MEDICARE

## 2025-06-20 DIAGNOSIS — E78.5 HYPERLIPIDEMIA, UNSPECIFIED: ICD-10-CM

## 2025-06-20 DIAGNOSIS — Z00.00 ENCOUNTER FOR GENERAL ADULT MEDICAL EXAMINATION WITHOUT ABNORMAL FINDINGS: ICD-10-CM

## 2025-06-20 DIAGNOSIS — Z12.5 ENCOUNTER FOR SCREENING FOR MALIGNANT NEOPLASM OF PROSTATE: ICD-10-CM

## 2025-06-20 LAB
ALBUMIN SERPL BCG-MCNC: 4 G/DL (ref 3.5–5.2)
ALP SERPL-CCNC: 86 U/L (ref 40–150)
ALT SERPL W P-5'-P-CCNC: 31 U/L (ref 0–70)
ANION GAP SERPL CALCULATED.3IONS-SCNC: 10 MMOL/L (ref 7–15)
AST SERPL W P-5'-P-CCNC: 34 U/L (ref 0–45)
BILIRUB SERPL-MCNC: 0.8 MG/DL
BUN SERPL-MCNC: 14.9 MG/DL (ref 8–23)
CALCIUM SERPL-MCNC: 8.8 MG/DL (ref 8.8–10.4)
CHLORIDE SERPL-SCNC: 101 MMOL/L (ref 98–107)
CHOLEST SERPL-MCNC: 146 MG/DL
CREAT SERPL-MCNC: 1.03 MG/DL (ref 0.67–1.17)
EGFRCR SERPLBLD CKD-EPI 2021: 79 ML/MIN/1.73M2
FASTING STATUS PATIENT QL REPORTED: ABNORMAL
FASTING STATUS PATIENT QL REPORTED: ABNORMAL
GLUCOSE SERPL-MCNC: 166 MG/DL (ref 70–99)
HCO3 SERPL-SCNC: 27 MMOL/L (ref 22–29)
HDLC SERPL-MCNC: 34 MG/DL
LDLC SERPL CALC-MCNC: 54 MG/DL
NONHDLC SERPL-MCNC: 112 MG/DL
POTASSIUM SERPL-SCNC: 4.1 MMOL/L (ref 3.4–5.3)
PROT SERPL-MCNC: 6.9 G/DL (ref 6.4–8.3)
PSA SERPL DL<=0.01 NG/ML-MCNC: 1.43 NG/ML (ref 0–4.5)
SODIUM SERPL-SCNC: 138 MMOL/L (ref 135–145)
TRIGL SERPL-MCNC: 291 MG/DL
TSH SERPL DL<=0.005 MIU/L-ACNC: 4.67 UIU/ML (ref 0.3–4.2)

## 2025-06-20 PROCEDURE — 80061 LIPID PANEL: CPT | Mod: ORL | Performed by: FAMILY MEDICINE

## 2025-06-20 PROCEDURE — G0103 PSA SCREENING: HCPCS | Mod: ORL | Performed by: FAMILY MEDICINE

## 2025-06-20 PROCEDURE — 86765 RUBEOLA ANTIBODY: CPT | Mod: ORL | Performed by: FAMILY MEDICINE

## 2025-06-20 PROCEDURE — 84443 ASSAY THYROID STIM HORMONE: CPT | Mod: ORL | Performed by: FAMILY MEDICINE

## 2025-06-20 PROCEDURE — 80053 COMPREHEN METABOLIC PANEL: CPT | Mod: ORL | Performed by: FAMILY MEDICINE

## 2025-06-23 LAB
MEV IGG SER IA-ACNC: >300 AU/ML
MEV IGG SER IA-ACNC: POSITIVE

## 2025-07-24 ENCOUNTER — APPOINTMENT (OUTPATIENT)
Dept: CT IMAGING | Facility: HOSPITAL | Age: 70
End: 2025-07-24
Attending: FAMILY MEDICINE
Payer: MEDICARE

## 2025-07-24 ENCOUNTER — HOSPITAL ENCOUNTER (EMERGENCY)
Facility: HOSPITAL | Age: 70
End: 2025-07-24
Attending: FAMILY MEDICINE
Payer: MEDICARE

## 2025-07-24 VITALS
TEMPERATURE: 96.8 F | DIASTOLIC BLOOD PRESSURE: 72 MMHG | RESPIRATION RATE: 15 BRPM | SYSTOLIC BLOOD PRESSURE: 133 MMHG | WEIGHT: 186 LBS | HEART RATE: 98 BPM | OXYGEN SATURATION: 93 %

## 2025-07-24 DIAGNOSIS — K92.2 LOWER GI BLEED: Primary | ICD-10-CM

## 2025-07-24 DIAGNOSIS — D62 ANEMIA DUE TO BLOOD LOSS, ACUTE: ICD-10-CM

## 2025-07-24 DIAGNOSIS — M62.81 GENERALIZED MUSCLE WEAKNESS: ICD-10-CM

## 2025-07-24 LAB
ABO + RH BLD: NORMAL
ANION GAP SERPL CALCULATED.3IONS-SCNC: 10 MMOL/L (ref 7–15)
BASOPHILS # BLD AUTO: 0 10E3/UL (ref 0–0.2)
BASOPHILS NFR BLD AUTO: 1 %
BLD GP AB SCN SERPL QL: NEGATIVE
BUN SERPL-MCNC: 18.7 MG/DL (ref 8–23)
CALCIUM SERPL-MCNC: 8.4 MG/DL (ref 8.8–10.4)
CHLORIDE SERPL-SCNC: 108 MMOL/L (ref 98–107)
CREAT SERPL-MCNC: 0.96 MG/DL (ref 0.67–1.17)
EGFRCR SERPLBLD CKD-EPI 2021: 86 ML/MIN/1.73M2
EOSINOPHIL # BLD AUTO: 0.4 10E3/UL (ref 0–0.7)
EOSINOPHIL NFR BLD AUTO: 5 %
ERYTHROCYTE [DISTWIDTH] IN BLOOD BY AUTOMATED COUNT: 13.2 % (ref 10–15)
GLUCOSE SERPL-MCNC: 162 MG/DL (ref 70–99)
HCO3 SERPL-SCNC: 20 MMOL/L (ref 22–29)
HCT VFR BLD AUTO: 37.4 % (ref 40–53)
HGB BLD-MCNC: 12.3 G/DL (ref 13.3–17.7)
IMM GRANULOCYTES # BLD: 0.1 10E3/UL
IMM GRANULOCYTES NFR BLD: 1 %
LACTATE SERPL-SCNC: 1.9 MMOL/L (ref 0.7–2)
LYMPHOCYTES # BLD AUTO: 2.8 10E3/UL (ref 0.8–5.3)
LYMPHOCYTES NFR BLD AUTO: 37 %
MCH RBC QN AUTO: 28.9 PG (ref 26.5–33)
MCHC RBC AUTO-ENTMCNC: 32.9 G/DL (ref 31.5–36.5)
MCV RBC AUTO: 88 FL (ref 78–100)
MONOCYTES # BLD AUTO: 0.6 10E3/UL (ref 0–1.3)
MONOCYTES NFR BLD AUTO: 8 %
NEUTROPHILS # BLD AUTO: 3.8 10E3/UL (ref 1.6–8.3)
NEUTROPHILS NFR BLD AUTO: 49 %
NRBC # BLD AUTO: 0 10E3/UL
NRBC BLD AUTO-RTO: 0 /100
PLATELET # BLD AUTO: 258 10E3/UL (ref 150–450)
POTASSIUM SERPL-SCNC: 3.9 MMOL/L (ref 3.4–5.3)
RBC # BLD AUTO: 4.25 10E6/UL (ref 4.4–5.9)
SODIUM SERPL-SCNC: 138 MMOL/L (ref 135–145)
SPECIMEN EXP DATE BLD: NORMAL
WBC # BLD AUTO: 7.8 10E3/UL (ref 4–11)

## 2025-07-24 PROCEDURE — 86900 BLOOD TYPING SEROLOGIC ABO: CPT | Performed by: FAMILY MEDICINE

## 2025-07-24 PROCEDURE — 74174 CTA ABD&PLVS W/CONTRAST: CPT

## 2025-07-24 PROCEDURE — 99285 EMERGENCY DEPT VISIT HI MDM: CPT | Mod: 25

## 2025-07-24 PROCEDURE — 36415 COLL VENOUS BLD VENIPUNCTURE: CPT | Performed by: FAMILY MEDICINE

## 2025-07-24 PROCEDURE — 85004 AUTOMATED DIFF WBC COUNT: CPT | Performed by: FAMILY MEDICINE

## 2025-07-24 PROCEDURE — 83605 ASSAY OF LACTIC ACID: CPT | Performed by: FAMILY MEDICINE

## 2025-07-24 PROCEDURE — 86923 COMPATIBILITY TEST ELECTRIC: CPT | Performed by: HOSPITALIST

## 2025-07-24 PROCEDURE — 86923 COMPATIBILITY TEST ELECTRIC: CPT

## 2025-07-24 PROCEDURE — 80048 BASIC METABOLIC PNL TOTAL CA: CPT | Performed by: FAMILY MEDICINE

## 2025-07-24 PROCEDURE — 86923 COMPATIBILITY TEST ELECTRIC: CPT | Performed by: INTERNAL MEDICINE

## 2025-07-24 PROCEDURE — 96360 HYDRATION IV INFUSION INIT: CPT | Mod: 59

## 2025-07-24 PROCEDURE — 99285 EMERGENCY DEPT VISIT HI MDM: CPT | Mod: 25 | Performed by: FAMILY MEDICINE

## 2025-07-24 ASSESSMENT — COLUMBIA-SUICIDE SEVERITY RATING SCALE - C-SSRS
1. IN THE PAST MONTH, HAVE YOU WISHED YOU WERE DEAD OR WISHED YOU COULD GO TO SLEEP AND NOT WAKE UP?: NO
6. HAVE YOU EVER DONE ANYTHING, STARTED TO DO ANYTHING, OR PREPARED TO DO ANYTHING TO END YOUR LIFE?: NO
2. HAVE YOU ACTUALLY HAD ANY THOUGHTS OF KILLING YOURSELF IN THE PAST MONTH?: NO

## 2025-07-24 ASSESSMENT — ENCOUNTER SYMPTOMS
ABDOMINAL PAIN: 1
BLOOD IN STOOL: 1

## 2025-07-25 ENCOUNTER — ANESTHESIA EVENT (OUTPATIENT)
Dept: SURGERY | Facility: HOSPITAL | Age: 70
End: 2025-07-25
Payer: MEDICARE

## 2025-07-25 ENCOUNTER — APPOINTMENT (OUTPATIENT)
Dept: CT IMAGING | Facility: HOSPITAL | Age: 70
DRG: 356 | End: 2025-07-25
Payer: MEDICARE

## 2025-07-25 ENCOUNTER — APPOINTMENT (OUTPATIENT)
Dept: CT IMAGING | Facility: HOSPITAL | Age: 70
DRG: 356 | End: 2025-07-25
Attending: INTERNAL MEDICINE
Payer: MEDICARE

## 2025-07-25 PROBLEM — K62.5 BRIGHT RED RECTAL BLEEDING: Status: ACTIVE | Noted: 2025-07-25

## 2025-07-25 PROBLEM — K92.2 GI BLEED: Status: ACTIVE | Noted: 2025-07-25

## 2025-07-25 LAB
ABO + RH BLD: NORMAL
ANION GAP SERPL CALCULATED.3IONS-SCNC: 11 MMOL/L (ref 7–15)
BLD PROD TYP BPU: NORMAL
BLOOD COMPONENT TYPE: NORMAL
BUN SERPL-MCNC: 11.9 MG/DL (ref 8–23)
CALCIUM SERPL-MCNC: 7.8 MG/DL (ref 8.8–10.4)
CHLORIDE SERPL-SCNC: 107 MMOL/L (ref 98–107)
CODING SYSTEM: NORMAL
CREAT SERPL-MCNC: 0.85 MG/DL (ref 0.67–1.17)
CROSSMATCH: NORMAL
EGFRCR SERPLBLD CKD-EPI 2021: >90 ML/MIN/1.73M2
ERYTHROCYTE [DISTWIDTH] IN BLOOD BY AUTOMATED COUNT: 13.3 % (ref 10–15)
GLUCOSE BLDC GLUCOMTR-MCNC: 130 MG/DL (ref 70–99)
GLUCOSE BLDC GLUCOMTR-MCNC: 147 MG/DL (ref 70–99)
GLUCOSE SERPL-MCNC: 164 MG/DL (ref 70–99)
HCO3 SERPL-SCNC: 19 MMOL/L (ref 22–29)
HCT VFR BLD AUTO: 26.5 % (ref 40–53)
HGB BLD-MCNC: 10.6 G/DL (ref 13.3–17.7)
HGB BLD-MCNC: 7.6 G/DL (ref 13.3–17.7)
HGB BLD-MCNC: 8.9 G/DL (ref 13.3–17.7)
HGB BLD-MCNC: 8.9 G/DL (ref 13.3–17.7)
HGB BLD-MCNC: 9.6 G/DL (ref 13.3–17.7)
HGB BLD-MCNC: 9.7 G/DL (ref 13.3–17.7)
HOLD SPECIMEN: NORMAL
HOLD SPECIMEN: NORMAL
ISSUE DATE AND TIME: NORMAL
MCH RBC QN AUTO: 30.3 PG (ref 26.5–33)
MCHC RBC AUTO-ENTMCNC: 33.6 G/DL (ref 31.5–36.5)
MCV RBC AUTO: 88 FL (ref 78–100)
MCV RBC AUTO: 89 FL (ref 78–100)
MCV RBC AUTO: 90 FL (ref 78–100)
PLATELET # BLD AUTO: 143 10E3/UL (ref 150–450)
POTASSIUM SERPL-SCNC: 4.3 MMOL/L (ref 3.4–5.3)
RBC # BLD AUTO: 2.94 10E6/UL (ref 4.4–5.9)
SODIUM SERPL-SCNC: 137 MMOL/L (ref 135–145)
SPECIMEN EXP DATE BLD: NORMAL
UNIT ABO/RH: NORMAL
UNIT NUMBER: NORMAL
UNIT STATUS: NORMAL
UNIT TYPE ISBT: 5100
WBC # BLD AUTO: 7.7 10E3/UL (ref 4–11)

## 2025-07-25 PROCEDURE — 120N000004 HC R&B MS OVERFLOW

## 2025-07-25 PROCEDURE — 36415 COLL VENOUS BLD VENIPUNCTURE: CPT | Performed by: HOSPITALIST

## 2025-07-25 PROCEDURE — 258N000003 HC RX IP 258 OP 636: Performed by: HOSPITALIST

## 2025-07-25 PROCEDURE — 258N000003 HC RX IP 258 OP 636

## 2025-07-25 PROCEDURE — 72125 CT NECK SPINE W/O DYE: CPT

## 2025-07-25 PROCEDURE — 74174 CTA ABD&PLVS W/CONTRAST: CPT

## 2025-07-25 PROCEDURE — 36415 COLL VENOUS BLD VENIPUNCTURE: CPT | Performed by: FAMILY MEDICINE

## 2025-07-25 PROCEDURE — 85018 HEMOGLOBIN: CPT | Performed by: HOSPITALIST

## 2025-07-25 PROCEDURE — 999N000157 HC STATISTIC RCP TIME EA 10 MIN

## 2025-07-25 PROCEDURE — 86901 BLOOD TYPING SEROLOGIC RH(D): CPT | Performed by: FAMILY MEDICINE

## 2025-07-25 PROCEDURE — G0378 HOSPITAL OBSERVATION PER HR: HCPCS

## 2025-07-25 PROCEDURE — 85018 HEMOGLOBIN: CPT | Performed by: INTERNAL MEDICINE

## 2025-07-25 PROCEDURE — 258N000003 HC RX IP 258 OP 636: Performed by: FAMILY MEDICINE

## 2025-07-25 PROCEDURE — 80048 BASIC METABOLIC PNL TOTAL CA: CPT

## 2025-07-25 PROCEDURE — 85018 HEMOGLOBIN: CPT

## 2025-07-25 PROCEDURE — 85018 HEMOGLOBIN: CPT | Performed by: FAMILY MEDICINE

## 2025-07-25 PROCEDURE — 250N000011 HC RX IP 250 OP 636: Performed by: INTERNAL MEDICINE

## 2025-07-25 PROCEDURE — 250N000013 HC RX MED GY IP 250 OP 250 PS 637: Performed by: HOSPITALIST

## 2025-07-25 PROCEDURE — 70450 CT HEAD/BRAIN W/O DYE: CPT

## 2025-07-25 PROCEDURE — 250N000013 HC RX MED GY IP 250 OP 250 PS 637: Performed by: PHYSICIAN ASSISTANT

## 2025-07-25 PROCEDURE — 36415 COLL VENOUS BLD VENIPUNCTURE: CPT

## 2025-07-25 PROCEDURE — 99207 PR NO CHARGE LOS: CPT | Performed by: INTERNAL MEDICINE

## 2025-07-25 PROCEDURE — 250N000011 HC RX IP 250 OP 636: Performed by: FAMILY MEDICINE

## 2025-07-25 PROCEDURE — 96361 HYDRATE IV INFUSION ADD-ON: CPT

## 2025-07-25 PROCEDURE — 99222 1ST HOSP IP/OBS MODERATE 55: CPT | Mod: AI | Performed by: HOSPITALIST

## 2025-07-25 RX ORDER — IOPAMIDOL 755 MG/ML
90 INJECTION, SOLUTION INTRAVASCULAR ONCE
Status: COMPLETED | OUTPATIENT
Start: 2025-07-25 | End: 2025-07-25

## 2025-07-25 RX ORDER — ACETAMINOPHEN 650 MG/1
650 SUPPOSITORY RECTAL EVERY 4 HOURS PRN
Status: DISCONTINUED | OUTPATIENT
Start: 2025-07-25 | End: 2025-07-31 | Stop reason: HOSPADM

## 2025-07-25 RX ORDER — SODIUM CHLORIDE, SODIUM LACTATE, POTASSIUM CHLORIDE, CALCIUM CHLORIDE 600; 310; 30; 20 MG/100ML; MG/100ML; MG/100ML; MG/100ML
INJECTION, SOLUTION INTRAVENOUS CONTINUOUS
Status: ACTIVE | OUTPATIENT
Start: 2025-07-25 | End: 2025-07-25

## 2025-07-25 RX ORDER — SODIUM CHLORIDE 9 MG/ML
INJECTION, SOLUTION INTRAVENOUS CONTINUOUS
Status: DISCONTINUED | OUTPATIENT
Start: 2025-07-25 | End: 2025-07-28

## 2025-07-25 RX ORDER — PROCHLORPERAZINE MALEATE 5 MG/1
5 TABLET ORAL EVERY 6 HOURS PRN
Status: DISCONTINUED | OUTPATIENT
Start: 2025-07-25 | End: 2025-07-31 | Stop reason: HOSPADM

## 2025-07-25 RX ORDER — ALBUTEROL SULFATE 90 UG/1
1 INHALANT RESPIRATORY (INHALATION) EVERY 6 HOURS PRN
Status: DISCONTINUED | OUTPATIENT
Start: 2025-07-25 | End: 2025-07-31 | Stop reason: HOSPADM

## 2025-07-25 RX ORDER — FLUTICASONE FUROATE AND VILANTEROL 100; 25 UG/1; UG/1
1 POWDER RESPIRATORY (INHALATION) DAILY
Status: DISCONTINUED | OUTPATIENT
Start: 2025-07-25 | End: 2025-07-31 | Stop reason: HOSPADM

## 2025-07-25 RX ORDER — ACETAMINOPHEN 325 MG/1
650 TABLET ORAL EVERY 4 HOURS PRN
Status: DISCONTINUED | OUTPATIENT
Start: 2025-07-25 | End: 2025-07-31 | Stop reason: HOSPADM

## 2025-07-25 RX ORDER — LATANOPROST 50 UG/ML
1 SOLUTION/ DROPS OPHTHALMIC AT BEDTIME
Status: DISCONTINUED | OUTPATIENT
Start: 2025-07-25 | End: 2025-07-31 | Stop reason: HOSPADM

## 2025-07-25 RX ORDER — IOPAMIDOL 755 MG/ML
75 INJECTION, SOLUTION INTRAVASCULAR ONCE
Status: COMPLETED | OUTPATIENT
Start: 2025-07-25 | End: 2025-07-25

## 2025-07-25 RX ORDER — AMOXICILLIN 250 MG
2 CAPSULE ORAL 2 TIMES DAILY PRN
Status: DISCONTINUED | OUTPATIENT
Start: 2025-07-25 | End: 2025-07-26

## 2025-07-25 RX ORDER — IPRATROPIUM BROMIDE AND ALBUTEROL SULFATE 2.5; .5 MG/3ML; MG/3ML
3 SOLUTION RESPIRATORY (INHALATION) EVERY 4 HOURS PRN
Status: DISCONTINUED | OUTPATIENT
Start: 2025-07-25 | End: 2025-07-31 | Stop reason: HOSPADM

## 2025-07-25 RX ORDER — PREDNISONE 5 MG/1
180 TABLET ORAL DAILY
Status: DISCONTINUED | OUTPATIENT
Start: 2025-07-25 | End: 2025-07-31 | Stop reason: HOSPADM

## 2025-07-25 RX ORDER — FLUTICASONE PROPIONATE AND SALMETEROL 50; 250 UG/1; UG/1
1 POWDER RESPIRATORY (INHALATION) 2 TIMES DAILY
COMMUNITY
Start: 2025-07-16

## 2025-07-25 RX ORDER — FLUTICASONE PROPIONATE 50 MCG
1 SPRAY, SUSPENSION (ML) NASAL DAILY
Status: DISCONTINUED | OUTPATIENT
Start: 2025-07-25 | End: 2025-07-31 | Stop reason: HOSPADM

## 2025-07-25 RX ORDER — ONDANSETRON 2 MG/ML
4 INJECTION INTRAMUSCULAR; INTRAVENOUS EVERY 6 HOURS PRN
Status: DISCONTINUED | OUTPATIENT
Start: 2025-07-25 | End: 2025-07-31 | Stop reason: HOSPADM

## 2025-07-25 RX ORDER — ONDANSETRON 4 MG/1
4 TABLET, ORALLY DISINTEGRATING ORAL EVERY 6 HOURS PRN
Status: DISCONTINUED | OUTPATIENT
Start: 2025-07-25 | End: 2025-07-31 | Stop reason: HOSPADM

## 2025-07-25 RX ORDER — AMOXICILLIN 250 MG
1 CAPSULE ORAL 2 TIMES DAILY PRN
Status: DISCONTINUED | OUTPATIENT
Start: 2025-07-25 | End: 2025-07-26

## 2025-07-25 RX ADMIN — FEXOFENADINE HYDROCHLORIDE 180 MG: 60 TABLET ORAL at 08:55

## 2025-07-25 RX ADMIN — POLYETHYLENE GLYCOL 3350, SODIUM SULFATE ANHYDROUS, SODIUM BICARBONATE, SODIUM CHLORIDE, POTASSIUM CHLORIDE 4000 ML: 236; 22.74; 6.74; 5.86; 2.97 POWDER, FOR SOLUTION ORAL at 22:04

## 2025-07-25 RX ADMIN — SODIUM CHLORIDE 1000 ML: 0.9 INJECTION, SOLUTION INTRAVENOUS at 00:26

## 2025-07-25 RX ADMIN — FLUTICASONE FUROATE AND VILANTEROL TRIFENATATE 1 PUFF: 100; 25 POWDER RESPIRATORY (INHALATION) at 08:55

## 2025-07-25 RX ADMIN — FLUTICASONE PROPIONATE 1 SPRAY: 50 SPRAY, METERED NASAL at 08:56

## 2025-07-25 RX ADMIN — IOPAMIDOL 90 ML: 755 INJECTION, SOLUTION INTRAVENOUS at 00:26

## 2025-07-25 RX ADMIN — SODIUM CHLORIDE 500 ML: 0.9 INJECTION, SOLUTION INTRAVENOUS at 20:31

## 2025-07-25 RX ADMIN — IOPAMIDOL 75 ML: 755 INJECTION, SOLUTION INTRAVENOUS at 19:12

## 2025-07-25 RX ADMIN — LATANOPROST 1 DROP: 50 SOLUTION/ DROPS OPHTHALMIC at 22:54

## 2025-07-25 RX ADMIN — SODIUM CHLORIDE, SODIUM LACTATE, POTASSIUM CHLORIDE, AND CALCIUM CHLORIDE: .6; .31; .03; .02 INJECTION, SOLUTION INTRAVENOUS at 03:07

## 2025-07-25 ASSESSMENT — ACTIVITIES OF DAILY LIVING (ADL)
ADLS_ACUITY_SCORE: 25
ADLS_ACUITY_SCORE: 25
ADLS_ACUITY_SCORE: 43
DEPENDENT_IADLS:: TRANSPORTATION
ADLS_ACUITY_SCORE: 41
FALL_HISTORY_WITHIN_LAST_SIX_MONTHS: NO
ADLS_ACUITY_SCORE: 41
ADLS_ACUITY_SCORE: 25
ADLS_ACUITY_SCORE: 43
WALKING_OR_CLIMBING_STAIRS_DIFFICULTY: NO
ADLS_ACUITY_SCORE: 25
ADLS_ACUITY_SCORE: 25
CHANGE_IN_FUNCTIONAL_STATUS_SINCE_ONSET_OF_CURRENT_ILLNESS/INJURY: NO
ADLS_ACUITY_SCORE: 42
ADLS_ACUITY_SCORE: 41
ADLS_ACUITY_SCORE: 25
ADLS_ACUITY_SCORE: 35
ADLS_ACUITY_SCORE: 43
DIFFICULTY_EATING/SWALLOWING: NO
ADLS_ACUITY_SCORE: 42
ADLS_ACUITY_SCORE: 25
DRESSING/BATHING_DIFFICULTY: NO
ADLS_ACUITY_SCORE: 42
ADLS_ACUITY_SCORE: 25
ADLS_ACUITY_SCORE: 25
TOILETING_ISSUES: NO
ADLS_ACUITY_SCORE: 25
ADLS_ACUITY_SCORE: 41
DOING_ERRANDS_INDEPENDENTLY_DIFFICULTY: NO

## 2025-07-25 NOTE — CONSULTS
GI CONSULT NOTE      Name: Juan Luis Mims  Medical Record #: [id number]  YOB: 1955  Date of Admission: 7/24/2025  Date/Time: 7/25/2025/8:17 AM     CHIEF COMPLAINT: rectal bleeding     HISTORY OF PRESENT ILLNESS: This is a 70 year old year old  patient seen at the request of Dr. Larson with a history of diagnosed COPD, prior nephrolithiasis, hyperlipidemia, seasonal allergies who presents to the ED with rectal bleeding and abdominal cramps which started last evening.  He took Pepto-Bismol, passed a formed stool, and after this he had 3 episodes of cramping and bright red blood per rectum within a 45-minute period.  He felt mildly dizzy.  Because of the persistent symptoms he had his wife bring him to the emergency room.    After coming to the emergency room he had a few more episodes of rectal bleeding spaced about an hour apart.  Around 3 AM stool became slightly more formed and darker red in color with jellylike consistency.  Since then he has not had any further bleeding.  Abdominal cramps have resolved.    His most recent screening colonoscopy was September 2023 showing a hyperplastic colon polyp, diverticulosis, and internal hemorrhoids.  He completed 3 sessions of hemorrhoid banding for symptoms of rectal pain, and this was very helpful.  He has had mild rectal bleeding in the past but never persistent symptoms like current.    No recent antibiotics.  Of note, he was taking ibuprofen or naproxen intermittently in the last couple of weeks for back pain, and he was taking these twice daily in the past couple of days.  No blood thinners.  No tobacco or alcohol use.  No known family history of GI pathology.    PAST MEDICAL HISTORY:  Past Medical History:   Diagnosis Date    Kidney stone 06/13/2023       FAMILY HISTORY:  No family history on file.    SOCIAL HISTORY:  Social History     Socioeconomic History    Marital status:      Spouse name: Not on file    Number of children: Not on  "file    Years of education: Not on file    Highest education level: Not on file   Occupational History    Not on file   Tobacco Use    Smoking status: Not on file    Smokeless tobacco: Not on file   Substance and Sexual Activity    Alcohol use: Not on file    Drug use: Not on file    Sexual activity: Not on file   Other Topics Concern    Not on file   Social History Narrative    Not on file     Social Drivers of Health     Financial Resource Strain: Not on file   Food Insecurity: Not on file   Transportation Needs: Not on file   Physical Activity: Not on file   Stress: Not on file   Social Connections: Not on file   Interpersonal Safety: Not on file   Housing Stability: Not on file       MEDICATIONS PRIOR TO ADMISSION: (Not in a hospital admission)         ALLERGIES: Patient has no known allergies.    REVIEW OF SYSTEMS (ROS): Complete review of systems negative other than listed in HPI.    PHYSICAL EXAM:    /58 (BP Location: Right arm)   Pulse 83   Temp 98.3  F (36.8  C) (Oral)   Resp 16   Ht 1.702 m (5' 7\")   Wt 84.4 kg (186 lb)   SpO2 98%   BMI 29.13 kg/m      GENERAL: Pleasant, no obvious distress    SKIN: No jaundice    NECK: Supple without adenopathy    EYES: No scleral icterus    LUNGS: Clear to auscultation bilaterally    HEART: Regular rate and rhythm, S1 and S2 present, no lower extremity edema    ABDOMEN: Soft, non-distended, non-tender, no guarding/rebound/mass, bowel sounds normal, no obvious organomegaly    MUSKULOSKELETAL:  Warm and well perfused, moves all extremities well    NEUROLOGIC: Alert and oriented    PSYCHIATRIC: Normal affect    LAB DATA:  Recent Labs   Lab Test 07/25/25  0148 07/24/25 2322 03/24/20  1415 03/23/20  0946   WBC  --  7.8 5.0  --    HGB 10.6* 12.3* 14.4  --    MCV 90 88 92  --    PLT  --  258 284  --    INR  --   --   --  1.04     Recent Labs   Lab Test 07/24/25  2322 06/20/25  1048 07/02/24  1100    138 138   POTASSIUM 3.9 4.1 4.5   CHLORIDE 108* 101 102 "   CO2 20* 27 27   BUN 18.7 14.9 14.3   CR 0.96 1.03 1.01   ANIONGAP 10 10 9   SUZE 8.4* 8.8 9.2   * 166* 116*     Recent Labs   Lab Test 06/20/25  1048 06/18/24  1153 06/15/23  1602 05/04/23  1354   ALBUMIN 4.0 4.2 4.2  --    BILITOTAL 0.8 0.6 0.7  --    ALT 31 46 61  --    AST 34 37 45  --    ALKPHOS 86 91 99  --    PROTEIN  --   --   --  50*     IMAGING:  CTA GI Bleed  Result Date: 7/25/2025  EXAM: CTA GI BLEED LOCATION: [facility] DATE: 7/25/2025 INDICATION: rectal bleeding today COMPARISON: CT abdomen/pelvis 6/13/2023 TECHNIQUE: CT angiogram abdomen pelvis during arterial phase of injection of IV contrast. 2D and 3D MIP reconstructions were performed by the CT technologist. Dose reduction techniques were used. CONTRAST: 90ml isovue 370 FINDINGS: ANGIOGRAM ABDOMEN/PELVIS: No intraluminal extravasation of contrast in small bowel or large bowel. Abdominal Aorta: Patent and normal in caliber. Mild aortic calcifications. No evidence of dissection or penetrating ulcer. Celiac Artery: Patent. Superior Mesenteric Artery: Patent. Right Renal Artery: Patent. Left Renal Artery: Patent. Inferior Mesenteric Artery: Patent. Right Common Iliac Artery: Patent. Right External Iliac Artery: Patent. Right Internal Iliac Artery: Patent. Right Common Femoral Artery: Patent. Proximal Right Superficial Femoral Artery: Patent. Proximal Right Deep Femoral Artery: Patent. Left Common Iliac Artery: Patent. Left External Iliac Artery: Patent. Left Internal Iliac Artery: Patent. Left Common Femoral Artery: Patent. Proximal Left Superficial Femoral Artery: Patent. Proximal Left Deep Femoral Artery: Patent. LOWER CHEST: Unremarkable. HEPATOBILIARY: Liver appears normal. Multiple tiny gallstones in the gallbladder. No significant gallbladder distention. No biliary dilatation. PANCREAS: Normal. SPLEEN: Punctate calcifications in the spleen, compatible with old granulomatous disease. ADRENAL GLANDS: Normal.  KIDNEYS/BLADDER: Few nonobstructing right renal calculi measuring up to 0.3 cm. No hydronephrosis. Kidneys appear normal. Urinary bladder is mostly decompressed. BOWEL: No intraluminal extravasation of contrast in small bowel or large bowel. Mild colonic diverticulosis. No evidence of acute diverticulitis. Small bowel appears normal. No bowel obstruction. Normal appendix. No evidence of acute appendicitis. LYMPH NODES: No lymphadenopathy. PELVIC ORGANS: Unremarkable. MUSCULOSKELETAL: Fat-containing left inguinal hernia. Multilevel degenerative changes of the spine.     IMPRESSION: 1.  No active gastrointestinal bleeding identified. 2.  Mild colonic diverticulosis. No evidence of acute diverticulitis.    ASSESSMENT:  He is a 70-year-old male with a history of COPD, seasonal allergies, hyperlipidemia, and recent back pain for which he was taking ibuprofen and naproxen.  He presents with several episodes of rectal bleeding and cramping.  CT angio was negative for active bleeding or obvious inflammation.  Hemoglobin has dropped slightly.  Bleeding and cramps seem to be resolving.  It is unclear if this could be a diverticular bleed, mild ischemic colitis, or other source of bleeding.  His last colonoscopy was less than 2 years ago but he is agreeable to repeat a colonoscopy tomorrow for further diagnosis.  Bleeding could be related to recent NSAID use.    Principal Problem:    Bright red rectal bleeding    PLAN:  Discussed with Dr.de Linder.  42 minutes of total time was spent providing patient care, including patient evaluation, reviewing documentation/test results, and .    1.  Clear liquid diet today then n.p.o. at midnight.    2.  Begin colonoscopy prep later.  3.  Plan for colonoscopy tomorrow morning.  4.  Continue to monitor hemoglobin.  5.  Further recommendations pending findings.                                                 Lucy Aldrich PA-C  Thank you for the opportunity to participate in the  care of this patient.   Please feel free to call me with any questions or concerns.  Phone number [phone].                    CC: [facility], No Ref-Primary, Physician

## 2025-07-25 NOTE — H&P
[facility]    History and Physical - Hospitalist Service       Date of Admission:  7/24/2025    Assessment & Plan      Juan Luis Mims is a 70 year old male admitted on 7/24/2025. He came to the ED for evaluation of rectal bleed    #Acute lower GI bleed  - Likely diverticular bleed  - CTA GI bleed scan showed no active bleeding  - GI consult    #Acute blood loss anemia  - Monitor hemoglobin  - Transfuse as needed for unstable hemodynamics or hemoglobin <7    #COPD  - Recent diagnosis, lifetime non-smoker  - Continue PTA Advair, fluticasone nasal spray, fexofenadine  - DuoNebs as needed     Observation Goals: -diagnostic tests and consults completed and resulted, -vital signs normal or at patient baseline, Nurse to notify provider when observation goals have been met and patient is ready for discharge.  Diet: NPO for Medical/Clinical Reasons Except for: Ice Chips, Meds    DVT Prophylaxis: Pneumatic Compression Devices  Mars Catheter: Not present  Lines: None     Cardiac Monitoring: ACTIVE order. Indication: Tachyarrhythmias, acute (48 hours)  Code Status: Full Code          Disposition Plan     Medically Ready for Discharge: Anticipated Tomorrow           Oniel Larson MD  Hospitalist Service  [facility]  Securely message with [employer] (more info)  Text page via [facility] Paging/Directory     ______________________________________________________________________    Chief Complaint   Rectal bleed    History is obtained from the patient, electronic health record, and emergency department physician    History of Present Illness   Juan Luis Mims is a 70 year old male who came to the ED for evaluation of rectal bleed.  Past medical history of glaucoma and recently diagnosed COPD.  Patient reports abdominal cramping followed by solid stool followed by liquid bloody stool.  Patient denied fevers, chills, recent traveling or camping.  He also denied chest pain, palpitations or  shortness of breath.  However, he has been taking NSAIDs for back pain over the last few days.  He was exposed to a friend who tested positive for COVID after coming back from [place] at the end of 2025 but he tested negative after that.  He lives with spouse and denies tobacco, alcohol or drugs.      Past Medical History    Past Medical History:   Diagnosis Date    Kidney stone 2023       Past Surgical History   No past surgical history on file.    Prior to Admission Medications   Prior to Admission Medications   Prescriptions Last Dose Informant Patient Reported? Taking?   ADVAIR DISKUS 250-50 MCG/ACT inhaler 2025 Evening Self Yes Yes   Sig: Inhale 1 puff into the lungs 2 times daily.   Multiple Vitamin (MULTI VITAMIN) TABS 2025 Morning Self Yes Yes   Sig: Take 1 tablet by mouth daily   Vitamin D, Cholecalciferol, 25 MCG (1000 UT) CAPS 2025 Morning Self Yes Yes   Sig: Take 2,000 mg by mouth daily.   albuterol (PROAIR HFA) 108 (90 Base) MCG/ACT inhaler  Self Yes Yes   Sig: Inhale 1 puff into the lungs every 6 hours as needed for shortness of breath.   atorvastatin (LIPITOR) 20 MG tablet 2025 Morning Self Yes Yes   Sig: Take 10 mg by mouth daily.   fexofenadine (ALLEGRA ALLERGY) 180 MG tablet 2025 Morning Self Yes Yes   Sig: Take 180 mg by mouth daily.   fluticasone (FLONASE ALLERGY RELIEF) 50 MCG/ACT nasal spray 2025 Morning Self Yes Yes   Si spray in each nostril Nasally Once a day for 30 day(s)   latanoprost (XALATAN) 0.005 % ophthalmic solution 2025 Bedtime Self Yes Yes   Sig: Place 1 drop into the right eye at bedtime. PLACE 1 DROP INTO RIGHT EYE EVERY EVENING      Facility-Administered Medications: None           Physical Exam   Vital Signs: Temp: 96.8  F (36  C) Temp src: Temporal BP: 135/68 Pulse: 88   Resp: 25 SpO2: 96 % O2 Device: None (Room air)    Weight: 186 lbs 0 oz    Constitutional: no apparent distress  Respiratory: no increased work of breathing and  clear to auscultation  Cardiovascular: regular rate and rhythm  GI: normal bowel sounds and non-tender  Musculoskeletal: no lower extremity pitting edema present    Medical Decision Making       45 MINUTES SPENT BY ME on the date of service doing chart review, history, exam, documentation & further activities per the note.  MANAGEMENT DISCUSSED with the following over the past 24 hours: ED provider       Data     I have personally reviewed the following data over the past 24 hrs:    7.8  \   10.6 (L)   / 258     138 108 (H) 18.7 /  162 (H)   3.9 20 (L) 0.96 \     Procal: N/A CRP: N/A Lactic Acid: 1.9         Imaging results reviewed over the past 24 hrs:   Recent Results (from the past 24 hours)   CTA GI Bleed    Narrative    EXAM: CTA GI BLEED  LOCATION: [facility]  DATE: 7/25/2025    INDICATION: rectal bleeding today  COMPARISON: CT abdomen/pelvis 6/13/2023  TECHNIQUE: CT angiogram abdomen pelvis during arterial phase of injection of IV contrast. 2D and 3D MIP reconstructions were performed by the CT technologist. Dose reduction techniques were used.  CONTRAST: 90ml isovue 370    FINDINGS:  ANGIOGRAM ABDOMEN/PELVIS:   No intraluminal extravasation of contrast in small bowel or large bowel.    Abdominal Aorta: Patent and normal in caliber. Mild aortic calcifications. No evidence of dissection or penetrating ulcer.    Celiac Artery: Patent.  Superior Mesenteric Artery: Patent.  Right Renal Artery: Patent.  Left Renal Artery: Patent.  Inferior Mesenteric Artery: Patent.    Right Common Iliac Artery: Patent.  Right External Iliac Artery: Patent.  Right Internal Iliac Artery: Patent.  Right Common Femoral Artery: Patent.  Proximal Right Superficial Femoral Artery: Patent.  Proximal Right Deep Femoral Artery: Patent.    Left Common Iliac Artery: Patent.  Left External Iliac Artery: Patent.  Left Internal Iliac Artery: Patent.  Left Common Femoral Artery: Patent.  Proximal Left Superficial  Femoral Artery: Patent.  Proximal Left Deep Femoral Artery: Patent.      LOWER CHEST: Unremarkable.    HEPATOBILIARY: Liver appears normal. Multiple tiny gallstones in the gallbladder. No significant gallbladder distention. No biliary dilatation.    PANCREAS: Normal.    SPLEEN: Punctate calcifications in the spleen, compatible with old granulomatous disease.    ADRENAL GLANDS: Normal.    KIDNEYS/BLADDER: Few nonobstructing right renal calculi measuring up to 0.3 cm. No hydronephrosis. Kidneys appear normal. Urinary bladder is mostly decompressed.    BOWEL: No intraluminal extravasation of contrast in small bowel or large bowel. Mild colonic diverticulosis. No evidence of acute diverticulitis. Small bowel appears normal. No bowel obstruction. Normal appendix. No evidence of acute appendicitis.    LYMPH NODES: No lymphadenopathy.    PELVIC ORGANS: Unremarkable.    MUSCULOSKELETAL: Fat-containing left inguinal hernia. Multilevel degenerative changes of the spine.      Impression    IMPRESSION:  1.  No active gastrointestinal bleeding identified.    2.  Mild colonic diverticulosis. No evidence of acute diverticulitis.

## 2025-07-25 NOTE — SIGNIFICANT EVENT
Significant Event Note    Time of event: 6:45 PM July 25, 2025    Description of event:  Rapid response called overhead.  Arrived to patient's room to find patient responsive but lying on the floor.  Had diffuse blood staining along down and lower extremities.    Per nursing report, patient had been complaining of feeling very warm.  Just prior to this the patient has had a bloody bowel movement and had just finished being cleaned up.  Nursing staff had left the room briefly and when she returned patient had apparently attempted to stand up on his own had fallen to the floor.  Patient himself reporting that he was feeling very warm and attempted to stand up.  He began feeling lightheaded and does not remember falling to the ground.  Does not recollect if he had any head impact.  Is not complaining of any head and/or neck pain.  Does not recall having had another bloody bowel movement around or when falling to the floor.  Feels cooler now lying on the floor but still lightheaded and fatigued.  Patient reports abdominal cramping without significant nausea.    Vital signs notable for tachycardia.  Blood pressure within normal range and maintaining oxygen saturations well.  Patient is alert and oriented x 3.  No apparent neurologic deficits other than fatigue.  Moving all extremities well.  No significant cervical spine or head tenderness.  No evidence of trauma.  Cardiac and respiratory exam unremarkable.  Bowel sounds present.  Blood staining along the groin region and lower extremities in the setting of bloody bowel movement.  Patient does appear pale and fatigued.  Easily arouses to voice.    Hospitalist present for rapid, recommending patient likely transfer to ICU.  Discussed with intensivist who accepted patient for transfer.  Patient cervical spine was secured with a c-collar patient was transferred to a bed.  Will plan for CTA of the abdomen to assess for acute bleed.  May consider IR intervention if acute  bleed is noted.  Will also obtain head and neck imaging due to fall with unknown head impact.    Plan:  - Stat head and neck CT w/o contrast due to fall   - Stat CTA abdomen/pelvis given profuse bloody bowel movements   - Bolus 500 ml NS (low threshold to bolus additional 500)   - CBC and BMP pending   - Place second IV  - Transfer to ICU     Discussed with: Hospitalist Dr. Gamboa. Hospitalist discussed case with intensivist who accepted patient for transfer.     Riana Torre,     Addendum 8:33 PM   CTA abd/pelvis negative for acute intraluminal gastrointestinal hemorrhage. Notable for fluid throughout colon suggestive of diarrheal illness. Also noted acute lateral eighth rib fracture. CT Head and neck negative for acute process. Spoke with [facility], recommended proceeding with [place] prep this evening and plan for colonoscopy tomorrow.   - May remove C-collar.   - Proceed with bowel prep  - mIVF with  ml/hr   - q4h hemoglobin checks

## 2025-07-25 NOTE — PLAN OF CARE
Problem: Pain Acute  Goal: Optimal Pain Control and Function  Intervention: Prevent or Manage Pain  Recent Flowsheet Documentation  Taken 7/25/2025 1000 by Florinda aMrie, RN  Medication Review/Management: medications reviewed     Problem: Gastrointestinal Bleeding  Goal: Hemostasis  Outcome: Progressing       Plan of Care Reviewed With: patient    Overall Patient Progress: improvingOverall Patient Progress: improving       Goal Outcome Evaluation: Aox4. RA. Ind. GI and care mgmt following. N loose, jelly-like Bms this shift. HGBs 9.6 and 9.7. Denies dizziness or SOB. Tele. LRs running on R AC at 100 mL. Clear liquid diet, NPO @ [facility]. Colonoscopy scheduled 7/26 AM, bowel prep this mary ann.

## 2025-07-25 NOTE — MEDICATION SCRIBE - ADMISSION MEDICATION HISTORY
Medication Scribe Admission Medication History    Admission medication history is complete. The information provided in this note is only as accurate as the sources available at the time of the update.    Information Source(s): Patient via in-person    Pertinent Information: PTA med list updated per patient.  Patient states that he takes Atorvastatin 10 mg instead of 20 mg daily.    Changes made to PTA medication list:  Added: Advair (per patient)  Deleted: None  Changed: Atorvastatin from 20 mg daily to 10 mg daily (per patient)    Allergies reviewed with patient and updates made in EHR: yes    Medication History Completed By: Rock Jensen 7/25/2025 1:23 AM    PTA Med List   Medication Sig Last Dose/Taking    ADVAIR DISKUS 250-50 MCG/ACT inhaler Inhale 1 puff into the lungs 2 times daily. 7/24/2025 Evening    albuterol (PROAIR HFA) 108 (90 Base) MCG/ACT inhaler Inhale 1 puff into the lungs every 6 hours as needed for shortness of breath. Taking As Needed    atorvastatin (LIPITOR) 20 MG tablet Take 10 mg by mouth daily. 7/24/2025 Morning    fexofenadine (ALLEGRA ALLERGY) 180 MG tablet Take 180 mg by mouth daily. 7/24/2025 Morning    fluticasone (FLONASE ALLERGY RELIEF) 50 MCG/ACT nasal spray 1 spray in each nostril Nasally Once a day for 30 day(s) 7/24/2025 Morning    latanoprost (XALATAN) 0.005 % ophthalmic solution Place 1 drop into the right eye at bedtime. PLACE 1 DROP INTO RIGHT EYE EVERY EVENING 7/24/2025 Bedtime    Multiple Vitamin (MULTI VITAMIN) TABS Take 1 tablet by mouth daily 7/24/2025 Morning    Vitamin D, Cholecalciferol, 25 MCG (1000 UT) CAPS Take 2,000 mg by mouth daily. 7/24/2025 Morning

## 2025-07-25 NOTE — PLAN OF CARE
Patient admitted to room 4 at approximately 1000 via bed from emergency room.  Reason for Admission:   Report received from:   Patient was accompanied by Self.  Discharge transportation provided by:  Patient ambulated/transferred:  independently. self.  Patient is alert and orientated x 4.  Outpatient Observation education provided to: (patient, family, friend)  MDRO Education done if applicable (MRSA, VRE, etc)  Safety risks were identified during admission:  none.   Yellow risk/fall band applied:  No  Home meds sent home: N/A   Home meds sent to pharmacy:No IF YES add 1/2 sheet laminated page reminder to chart/clipboard   Detailed Belongings: 1x belt, 1x pants, 2x shoes, 1x watch, 1x glasses, 1x shirt, 1x underwear, 1x jacket

## 2025-07-25 NOTE — CONSULTS
"Care Management Initial Consult    General Information  Assessment completed with: PatientJuan Luis  Type of CM/SW Visit: Initial Assessment    Primary Care Provider verified and updated as needed: Yes (updated on facesheet)   Readmission within the last 30 days: no previous admission in last 30 days      Reason for Consult: discharge planning  Advance Care Planning: Advance Care Planning Reviewed: no concerns identified          Communication Assessment  Patient's communication style: spoken language (English or Bilingual)    Hearing Difficulty or Deaf: no   Wear Glasses or Blind: yes    Cognitive  Cognitive/Neuro/Behavioral: WDL                      Living Environment:   People in home: spouse  Juan Luis and wife Veronica  Current living Arrangements: house      Able to return to prior arrangements: yes       Family/Social Support:  Care provided by: self  Provides care for: no one  Marital Status:   Support system: Wife  Veronica Lopez (\"I cannot remember her phone number for the contact information\")       Description of Support System: Involved, Supportive         Current Resources:   Patient receiving home care services: No        Community Resources: None  Equipment currently used at home: none  Supplies currently used at home: Oxygen Tubing/Supplies, Other (CPAP, glasses, bite guard at night)    Employment/Financial:  Employment Status: retired     Employment/ Comments: \"no  history\"  Financial Concerns: none   Referral to Financial Worker: No       Does the patient's insurance plan have a 3 day qualifying hospital stay waiver?  No    Lifestyle & Psychosocial Needs:  Social Drivers of Health     Food Insecurity: Low Risk  (7/25/2025)    Food Insecurity     Within the past 12 months, did you worry that your food would run out before you got money to buy more?: No     Within the past 12 months, did the food you bought just not last and you didn t have money to get more?: No   Depression: " "Not on file   Housing Stability: Low Risk  (7/25/2025)    Housing Stability     Do you have housing? : Yes     Are you worried about losing your housing?: No   Tobacco Use: Not on file   Financial Resource Strain: Low Risk  (7/25/2025)    Financial Resource Strain     Within the past 12 months, have you or your family members you live with been unable to get utilities (heat, electricity) when it was really needed?: No   Alcohol Use: Not on file   Transportation Needs: Low Risk  (7/25/2025)    Transportation Needs     Within the past 12 months, has lack of transportation kept you from medical appointments, getting your medicines, non-medical meetings or appointments, work, or from getting things that you need?: No   Physical Activity: Not on file   Interpersonal Safety: Not on file   Stress: Not on file   Social Connections: Not on file   Health Literacy: Not on file       Functional Status:  Prior to admission patient needed assistance:   Dependent ADLs:: Independent, Ambulation-no assistive device  Dependent IADLs:: Transportation (\"I have never driven, but my wife does drive\".)  Assesssment of Functional Status: At functional baseline    Mental Health Status:  Mental Health Status: No Current Concerns       Chemical Dependency Status:  Chemical Dependency Status: No Current Concerns             Values/Beliefs:  Spiritual, Cultural Beliefs, Evangelical Practices, Values that affect care: no               Discussed  Partnership in Safe Discharge Planning  document with patient/family: No    Additional Information:  CMA completed: Signed off    Juan Luis lives in a house with his wife Veronica Lopez. He is independent with all ADLs and most IADLs. \"I have never driven, but my wife does drive\".    Ride: Family: wife    ROBBY was given and discussed. All questions were answered. BUSTAMANTE on chart and faxed to HIM.    CM to follow for medical progression of care, discharge recommendations, and final discharge plan. Writer " verified patient demographics and updated any changes needed in the patient chart.    Next Steps:   Medical plan/delay: Medical Clearance. GI plan.    Dispo: return to home.    CM to do: signing off.    Lucy Bustillo RN

## 2025-07-25 NOTE — PROGRESS NOTES
"Jay Jay check was admitted earlier today with rectal bleed.  H&P by Dr. Larson reviewed.  Hemoglobin 12.3 at 2 AM.today, down to 9.7 at noon.  He had a few bloody bowel movements, without blood clots, associated abdominal pain, dysuria, fever, chills.  Patient asymptomatic, without chest pain, lightheadedness, dyspnea.  Mesenteric angiogram negative for active bleeding.  He was seen by GI, plan for colonoscopy tomorrow.  Patient was seen and examined.  He denies any symptoms while laying in bed.  /63 (BP Location: Left arm)   Pulse 90   Temp 98.3  F (36.8  C) (Oral)   Resp 16   Ht 1.702 m (5' 7\")   Wt 84.4 kg (186 lb)   SpO2 98%   BMI 29.13 kg/m    -Continue clear liquid diet  - Colonoscopy bowel prep per GI  - Blood transfusion consent signed  - Hemoglobin every 6 hours, transfuse to keep above 7  - POC D/W patient and his primary nurse.  "

## 2025-07-25 NOTE — ED NOTES
Bed: [place] [address]  Expected date:   Expected time:   Means of arrival:   Comments:  Room 3 when clean

## 2025-07-25 NOTE — ED NOTES
Pt presents NAD. SKIN: NWD.   HEENT: normocephalic, PERRL, clear x 3.   CHEST: symmetrical rise, CEBBS, no CP or SOB.  ABD: Pt c/o intermittent abdominal cramping, c/o dark red and bright red bloody loose stools.  EXT: KEEN x 4  Rest of exam unremarkable.

## 2025-07-25 NOTE — ED PROVIDER NOTES
"EMERGENCY DEPARTMENT ENCOUNTER      NAME: Juan Luis Mims  AGE: 70 year old male  YOB: 1955  MRN: [id number]  EVALUATION DATE & TIME: No admission date for patient encounter.    PCP: No Ref-Primary, Physician    ED PROVIDER: Jorden Amador M.D.    Chief Complaint   Patient presents with     Rectal Bleeding       FINAL IMPRESSION:  No diagnosis found.    ED COURSE & MEDICAL DECISION MAKING:    Pertinent Labs & Imaging studies independently interpreted by me. (See chart for details)  Reviewed most recent primary care visit from July 16 which was for chest congestion and nasal congestion, patient was started on Allegra, Advair for COPD was renewed  ED Course as of 07/24/25 2330   Thu Jul 24, 2025   2310 Patient seen and examined, presents today with 3 episodes of rectal bleeding today.,  Some lower abdominal cramping, described bright red to maroon blood.  On exam here, patient is tachycardic but otherwise vitally stable, mild lower abdominal tenderness.  Concern for diverticular bleed, diverticulitis, colitis.  Labs ordered along with CTA         At the conclusion of the encounter I discussed the results of all of the tests and the disposition. The questions were answered. The patient or family acknowledged understanding and was agreeable with the care plan.     Medical Decision Making  {DID YOU REMEMBER TO DOCUMENT...?:416535}  {ADMIT VS D/C:270518}    MIPS (CTPE, Dental pain, Mars, Sinusitis, Asthma/COPD, Head Trauma): {ECC MIPS DOCUMENTATION:224257}    SEPSIS: {Sepsis/Stemi/Stroke:283398::\"None\"}        PROCEDURES:       MEDICATIONS GIVEN IN THE EMERGENCY:  Medications - No data to display    NEW PRESCRIPTIONS STARTED AT TODAY'S ER VISIT  New Prescriptions    No medications on file       =================================================================    HPI    Patient information was obtained from: patient and wife.       Juan Luis Mims is a 70 year old male with a pertinent history of COPD, " "diverticular disease of colon, diverticular disease of large intestine, hemorrhoids, polyp of colon, who presents to this ED by walk-in for evaluation of rectal bleeding.     Patient presents to the ED for concerns of abdominal pain and hematochezia. He states that he had 3 episodes of blood in his stool. He states that he had a bowel movement with \"some clumps of stool\" first followed by the blood. The blood was darker on the outside and brighter red on the inside. He endorses lower abdominal pain that he describes as \"cramps\". He has taken Pepto for the abdominal pain. He currently feels pressure in his abdomen. He has COPD and he recently started Advair. He also has a history of hyperlipidemia. He does not smoke or drink alcohol. He had stitches in his leg in the past. He had a normal colonoscopy a few years ago. He denies anticoagulation.     Patient states no other complaints or concerns at this time.     Per Chart Review:   7/24/25, Juliana Aguila: Spirometry in clinic shows moderately severe obstructive process given patient's persistent and worsening symptoms, COPD is suspected. Given patient's wheezing and shortness of breath will initiate treatment with LABA/steroid inhaler as well as albuterol daily. Discussed close follow-up in 1 month with PCP. Continue with conservative treatment of seasonal allergies such as daily Flonase, saline rinses, and daily antihistamine. Patient educated on red flag symptoms that would indicate he should report to the emergency department such as chest pain or shortness of breath not resolved with steroid or albuterol inhaler. Does not appear to be infectious process at play, current exacterbation most likely due to seasonal allergies.      REVIEW OF SYSTEMS   Review of Systems   Gastrointestinal:  Positive for abdominal pain and blood in stool.   All other systems reviewed and are negative.     All other systems reviewed and negative    PAST MEDICAL HISTORY:  Past Medical " History:   Diagnosis Date     Kidney stone 06/13/2023       PAST SURGICAL HISTORY:  No past surgical history on file.    CURRENT MEDICATIONS:    No current facility-administered medications for this encounter.     Current Outpatient Medications   Medication Sig Dispense Refill     albuterol (PROAIR HFA) 108 (90 Base) MCG/ACT inhaler        atorvastatin (LIPITOR) 20 MG tablet        fexofenadine (ALLEGRA ALLERGY) 180 MG tablet 1/2 tab(s) orally once a day       fluticasone (FLONASE ALLERGY RELIEF) 50 MCG/ACT nasal spray 1 spray in each nostril Nasally Once a day for 30 day(s)       latanoprost (XALATAN) 0.005 % ophthalmic solution        Multiple Vitamin (MULTI VITAMIN) TABS Take 1 tablet by mouth daily       Vitamin D, Cholecalciferol, 25 MCG (1000 UT) CAPS Take 2,000 mg by mouth         ALLERGIES:  No Known Allergies    FAMILY HISTORY:  No family history on file.    SOCIAL HISTORY:   Social History     Socioeconomic History     Marital status:        VITALS:  There were no vitals taken for this visit.    PHYSICAL EXAM:  Physical Exam  Vitals and nursing note reviewed.   Constitutional:       Appearance: Normal appearance.   HENT:      Head: Normocephalic and atraumatic.      Right Ear: External ear normal.      Left Ear: External ear normal.      Nose: Nose normal.      Mouth/Throat:      Mouth: Mucous membranes are moist.   Eyes:      Extraocular Movements: Extraocular movements intact.      Conjunctiva/sclera: Conjunctivae normal.      Pupils: Pupils are equal, round, and reactive to light.   Cardiovascular:      Rate and Rhythm: Normal rate and regular rhythm.   Pulmonary:      Effort: Pulmonary effort is normal.      Breath sounds: Normal breath sounds. No wheezing or rales.   Abdominal:      General: Abdomen is flat. There is no distension.      Palpations: Abdomen is soft.      Tenderness: There is abdominal tenderness in the suprapubic area. There is no guarding.      Comments: Mild suprapubic  tenderness.    Musculoskeletal:         General: Normal range of motion.      Cervical back: Normal range of motion and neck supple.      Right lower leg: No edema.      Left lower leg: No edema.   Lymphadenopathy:      Cervical: No cervical adenopathy.   Skin:     General: Skin is warm and dry.   Neurological:      General: No focal deficit present.      Mental Status: He is alert and oriented to person, place, and time. Mental status is at baseline.      Comments: No gross focal neurologic deficits   Psychiatric:         Mood and Affect: Mood normal.         Behavior: Behavior normal.         Thought Content: Thought content normal.          LAB:  All pertinent labs reviewed and interpreted.       RADIOLOGY:  Reviewed all pertinent imaging. Please see official radiology report.  No orders to display       I, Germain Bearden, am serving as a scribe to document services personally performed by Dr. Amador based on my observation and the provider's statements to me. I, Jorden Amador MD attest that Germain Bearden is acting in a scribe capacity, has observed my performance of the services and has documented them in accordance with my direction.    Jorden Amador M.D.  Emergency Medicine  [facility] EMERGENCY DEPARTMENT  [facility] [facility] [place] [address]  [phone]  Dept: [phone]     normal.         Thought Content: Thought content normal.          LAB:  All pertinent labs reviewed and interpreted.  Results for orders placed or performed during the hospital encounter of 07/24/25   CTA GI Bleed    Impression    IMPRESSION:  1.  No active gastrointestinal bleeding identified.    2.  Mild colonic diverticulosis. No evidence of acute diverticulitis.   Basic Metabolic Panel (Limited Occurrences)   Result Value Ref Range    Sodium 138 135 - 145 mmol/L    Potassium 3.9 3.4 - 5.3 mmol/L    Chloride 108 (H) 98 - 107 mmol/L    Carbon Dioxide (CO2) 20 (L) 22 - 29 mmol/L    Anion Gap 10 7 - 15 mmol/L    Urea Nitrogen 18.7 8.0 - 23.0 mg/dL    Creatinine 0.96 0.67 - 1.17 mg/dL    GFR Estimate 86 >60 mL/min/1.73m2    Calcium 8.4 (L) 8.8 - 10.4 mg/dL    Glucose 162 (H) 70 - 99 mg/dL   CBC with platelets and differential   Result Value Ref Range    WBC Count 7.8 4.0 - 11.0 10e3/uL    RBC Count 4.25 (L) 4.40 - 5.90 10e6/uL    Hemoglobin 12.3 (L) 13.3 - 17.7 g/dL    Hematocrit 37.4 (L) 40.0 - 53.0 %    MCV 88 78 - 100 fL    MCH 28.9 26.5 - 33.0 pg    MCHC 32.9 31.5 - 36.5 g/dL    RDW 13.2 10.0 - 15.0 %    Platelet Count 258 150 - 450 10e3/uL    % Neutrophils 49 %    % Lymphocytes 37 %    % Monocytes 8 %    % Eosinophils 5 %    % Basophils 1 %    % Immature Granulocytes 1 %    NRBCs per 100 WBC 0 <1 /100    Absolute Neutrophils 3.8 1.6 - 8.3 10e3/uL    Absolute Lymphocytes 2.8 0.8 - 5.3 10e3/uL    Absolute Monocytes 0.6 0.0 - 1.3 10e3/uL    Absolute Eosinophils 0.4 0.0 - 0.7 10e3/uL    Absolute Basophils 0.0 0.0 - 0.2 10e3/uL    Absolute Immature Granulocytes 0.1 <=0.4 10e3/uL    Absolute NRBCs 0.0 10e3/uL   Lactic Acid Whole Blood with 1X Repeat in 2 HR when >2   Result Value Ref Range    Lactic Acid, Initial 1.9 0.7 - 2.0 mmol/L   Hemoglobin   Result Value Ref Range    Hemoglobin 10.6 (L) 13.3 - 17.7 g/dL    MCV 90 78 - 100 fL   Adult Type and Screen   Result Value Ref Range    ABO/RH(D) O POS     Antibody Screen  Negative Negative    SPECIMEN EXPIRATION DATE 7/27/2025 11:59:00 PM CDT    ABO and Rh   Result Value Ref Range    ABO/RH(D) O POS     SPECIMEN EXPIRATION DATE 7/28/2025 11:59:00 PM CDT        RADIOLOGY:  Reviewed all pertinent imaging. Please see official radiology report.  CTA GI Bleed   Final Result   IMPRESSION:   1.  No active gastrointestinal bleeding identified.      2.  Mild colonic diverticulosis. No evidence of acute diverticulitis.          I, Germain Bearden, am serving as a scribe to document services personally performed by Dr. Amador based on my observation and the provider's statements to me. I, Jorden Amador MD attest that Germain Bearden is acting in a scribe capacity, has observed my performance of the services and has documented them in accordance with my direction.    Jorden Amador M.D.  Emergency Medicine  [facility] EMERGENCY DEPARTMENT  [facility] [facility] [address] [phone]  Dept: [phone]       Jorden Amador MD  07/25/25 0212

## 2025-07-25 NOTE — PLAN OF CARE
Problem: Gastrointestinal Bleeding  Goal: Optimal Coping with Acute Illness  Outcome: Progressing  Goal: Hemostasis  Outcome: Progressing  Problem: Pain Acute  Goal: Optimal Pain Control and Function  Outcome: Progressing  Intervention: Develop Pain Management Plan  Intervention: Prevent or Manage Pain    Patient reports intermittent abdominal cramping. Reports improvement after each time he has had a bloody stool. Reports bloody stool x3 since arriving in the ED. First one was bright red, the last two were dark red. VSS. On IVF.    Patient educated on plan of care. Answered all questions and concerns, verbalized understanding. Plan of care ongoing.

## 2025-07-25 NOTE — PLAN OF CARE
Goal Outcome Evaluation:      Plan of Care Reviewed With: patient          Outcome Evaluation: Return to home.

## 2025-07-25 NOTE — PLAN OF CARE
PRIMARY DIAGNOSIS: GI BLEED    OUTPATIENT/OBSERVATION GOALS TO BE MET BEFORE DISCHARGE  Orthostatic performed: Yes:                                    Stable Hgb Yes.   Recent Labs   Lab Test 07/25/25  1141 07/25/25  0759 07/25/25  0148   HGB 9.7* 9.6* 10.6*       Resolved or declined bleeding episodes: No,  with a moderate amount of bleeding Last episode: 1100    Appropriate testing complete: No    Cleared for discharge by consultants (if involved): No    Safe discharge environment identified: Yes    Discharge Planner Nurse   Safe discharge environment identified: Yes  Barriers to discharge: Yes       Entered by: Florinda Maire RN 07/25/2025 12:35 PM

## 2025-07-25 NOTE — ED NOTES
"Pt ambulated to bathroom and back. Had \"jelly-like\" stool with a little more thickness than previous.  "

## 2025-07-25 NOTE — PLAN OF CARE
PRIMARY DIAGNOSIS: GI BLEED    OUTPATIENT/OBSERVATION GOALS TO BE MET BEFORE DISCHARGE  Orthostatic performed: no:    Stable Hgb No.   Recent Labs   Lab Test 07/25/25  0759 07/25/25  0148 07/24/25  2322   HGB 9.6* 10.6* 12.3*       Resolved or declined bleeding episodes: Yes Last episode: 7/24 NOCS    Appropriate testing complete: No    Cleared for discharge by consultants (if involved): No    Safe discharge environment identified: Yes    Discharge Planner Nurse   Safe discharge environment identified: Yes  Barriers to discharge: Yes       Entered by: Florinda Marie RN 07/25/2025 10:15 AM     Please review provider order for any additional goals.   Nurse to notify provider when observation goals have been met and patient is ready for discharge.

## 2025-07-25 NOTE — ED TRIAGE NOTES
Pt has had 3 bright red blood stools.  Pt states that the first one happened about an hour ago.  Pt denies being on a blood thinner.     Triage Assessment (Adult)       Row Name 07/24/25 5179          Triage Assessment    Airway WDL WDL        Respiratory WDL    Respiratory WDL WDL        Peripheral/Neurovascular WDL    Peripheral Neurovascular WDL WDL

## 2025-07-25 NOTE — PROGRESS NOTES
"RT x 2 responded to rapid response code, pt on the floor, blood across lower abdomen, pt admitted with rectal bleeding. Room air SpO2 97 %. BS are clear and equal bilat, RN and MD at bedside. RT available as needed.     /64 (BP Location: Left arm)   Pulse 93   Temp 98.5  F (36.9  C) (Oral)   Resp 16   Ht 1.702 m (5' 7\")   Wt 84.4 kg (186 lb)   SpO2 98%   BMI 29.13 kg/m  ls    "

## 2025-07-26 ENCOUNTER — ANESTHESIA (OUTPATIENT)
Dept: SURGERY | Facility: HOSPITAL | Age: 70
End: 2025-07-26
Payer: MEDICARE

## 2025-07-26 PROBLEM — E66.09 OBESITY DUE TO EXCESS CALORIES: Status: RESOLVED | Noted: 2025-07-26 | Resolved: 2025-07-26

## 2025-07-26 PROBLEM — E66.09 OBESITY DUE TO EXCESS CALORIES: Status: ACTIVE | Noted: 2025-07-26

## 2025-07-26 PROBLEM — K57.31 DIVERTICULAR HEMORRHAGE: Status: ACTIVE | Noted: 2025-07-26

## 2025-07-26 LAB
BLD PROD TYP BPU: NORMAL
BLOOD COMPONENT TYPE: NORMAL
CODING SYSTEM: NORMAL
COLONOSCOPY: NORMAL
CROSSMATCH: NORMAL
GLUCOSE BLDC GLUCOMTR-MCNC: 124 MG/DL (ref 70–99)
GLUCOSE BLDC GLUCOMTR-MCNC: 127 MG/DL (ref 70–99)
HGB BLD-MCNC: 7.3 G/DL (ref 13.3–17.7)
HGB BLD-MCNC: 7.6 G/DL (ref 13.3–17.7)
HGB BLD-MCNC: 8.3 G/DL (ref 13.3–17.7)
ISSUE DATE AND TIME: NORMAL
MCV RBC AUTO: 88 FL (ref 78–100)
MCV RBC AUTO: 89 FL (ref 78–100)
MCV RBC AUTO: 90 FL (ref 78–100)
UNIT ABO/RH: NORMAL
UNIT NUMBER: NORMAL
UNIT STATUS: NORMAL
UNIT TYPE ISBT: 5100

## 2025-07-26 PROCEDURE — 85018 HEMOGLOBIN: CPT | Performed by: INTERNAL MEDICINE

## 2025-07-26 PROCEDURE — 36415 COLL VENOUS BLD VENIPUNCTURE: CPT | Performed by: INTERNAL MEDICINE

## 2025-07-26 PROCEDURE — 99233 SBSQ HOSP IP/OBS HIGH 50: CPT | Performed by: INTERNAL MEDICINE

## 2025-07-26 PROCEDURE — 370N000017 HC ANESTHESIA TECHNICAL FEE, PER MIN: Performed by: INTERNAL MEDICINE

## 2025-07-26 PROCEDURE — 85018 HEMOGLOBIN: CPT

## 2025-07-26 PROCEDURE — 258N000003 HC RX IP 258 OP 636

## 2025-07-26 PROCEDURE — 0DJD8ZZ INSPECTION OF LOWER INTESTINAL TRACT, VIA NATURAL OR ARTIFICIAL OPENING ENDOSCOPIC: ICD-10-PCS | Performed by: INTERNAL MEDICINE

## 2025-07-26 PROCEDURE — P9016 RBC LEUKOCYTES REDUCED: HCPCS | Performed by: INTERNAL MEDICINE

## 2025-07-26 PROCEDURE — 360N000075 HC SURGERY LEVEL 2, PER MIN: Performed by: INTERNAL MEDICINE

## 2025-07-26 PROCEDURE — 250N000011 HC RX IP 250 OP 636: Performed by: INTERNAL MEDICINE

## 2025-07-26 PROCEDURE — 250N000011 HC RX IP 250 OP 636

## 2025-07-26 PROCEDURE — 999N000141 HC STATISTIC PRE-PROCEDURE NURSING ASSESSMENT: Performed by: INTERNAL MEDICINE

## 2025-07-26 PROCEDURE — 120N000004 HC R&B MS OVERFLOW

## 2025-07-26 PROCEDURE — P9016 RBC LEUKOCYTES REDUCED: HCPCS | Performed by: HOSPITALIST

## 2025-07-26 PROCEDURE — 272N000001 HC OR GENERAL SUPPLY STERILE: Performed by: INTERNAL MEDICINE

## 2025-07-26 PROCEDURE — 36415 COLL VENOUS BLD VENIPUNCTURE: CPT

## 2025-07-26 RX ORDER — NALOXONE HYDROCHLORIDE 1 MG/ML
0.1 INJECTION INTRAMUSCULAR; INTRAVENOUS; SUBCUTANEOUS
Status: DISCONTINUED | OUTPATIENT
Start: 2025-07-26 | End: 2025-07-26 | Stop reason: HOSPADM

## 2025-07-26 RX ORDER — ONDANSETRON 2 MG/ML
4 INJECTION INTRAMUSCULAR; INTRAVENOUS
Status: DISCONTINUED | OUTPATIENT
Start: 2025-07-26 | End: 2025-07-26 | Stop reason: HOSPADM

## 2025-07-26 RX ORDER — PROPOFOL 10 MG/ML
INJECTION, EMULSION INTRAVENOUS CONTINUOUS PRN
Status: DISCONTINUED | OUTPATIENT
Start: 2025-07-26 | End: 2025-07-26

## 2025-07-26 RX ORDER — ONDANSETRON 4 MG/1
4 TABLET, ORALLY DISINTEGRATING ORAL EVERY 30 MIN PRN
Status: DISCONTINUED | OUTPATIENT
Start: 2025-07-26 | End: 2025-07-26 | Stop reason: HOSPADM

## 2025-07-26 RX ORDER — LIDOCAINE 40 MG/G
CREAM TOPICAL
Status: DISCONTINUED | OUTPATIENT
Start: 2025-07-26 | End: 2025-07-26 | Stop reason: HOSPADM

## 2025-07-26 RX ORDER — SODIUM CHLORIDE, SODIUM LACTATE, POTASSIUM CHLORIDE, CALCIUM CHLORIDE 600; 310; 30; 20 MG/100ML; MG/100ML; MG/100ML; MG/100ML
INJECTION, SOLUTION INTRAVENOUS CONTINUOUS
Status: DISCONTINUED | OUTPATIENT
Start: 2025-07-26 | End: 2025-07-26 | Stop reason: HOSPADM

## 2025-07-26 RX ORDER — ONDANSETRON 2 MG/ML
INJECTION INTRAMUSCULAR; INTRAVENOUS PRN
Status: DISCONTINUED | OUTPATIENT
Start: 2025-07-26 | End: 2025-07-26

## 2025-07-26 RX ORDER — FENTANYL CITRATE-0.9 % NACL/PF 10 MCG/ML
PLASTIC BAG, INJECTION (ML) INTRAVENOUS CONTINUOUS PRN
Status: DISCONTINUED | OUTPATIENT
Start: 2025-07-26 | End: 2025-07-26

## 2025-07-26 RX ORDER — ONDANSETRON 2 MG/ML
4 INJECTION INTRAMUSCULAR; INTRAVENOUS EVERY 30 MIN PRN
Status: DISCONTINUED | OUTPATIENT
Start: 2025-07-26 | End: 2025-07-26 | Stop reason: HOSPADM

## 2025-07-26 RX ORDER — OXYCODONE HYDROCHLORIDE 5 MG/1
5 TABLET ORAL
Status: DISCONTINUED | OUTPATIENT
Start: 2025-07-26 | End: 2025-07-26 | Stop reason: HOSPADM

## 2025-07-26 RX ORDER — PROPOFOL 10 MG/ML
INJECTION, EMULSION INTRAVENOUS PRN
Status: DISCONTINUED | OUTPATIENT
Start: 2025-07-26 | End: 2025-07-26

## 2025-07-26 RX ORDER — DEXAMETHASONE SODIUM PHOSPHATE 4 MG/ML
4 INJECTION, SOLUTION INTRA-ARTICULAR; INTRALESIONAL; INTRAMUSCULAR; INTRAVENOUS; SOFT TISSUE
Status: DISCONTINUED | OUTPATIENT
Start: 2025-07-26 | End: 2025-07-26 | Stop reason: HOSPADM

## 2025-07-26 RX ORDER — OXYCODONE HYDROCHLORIDE 5 MG/1
10 TABLET ORAL
Status: DISCONTINUED | OUTPATIENT
Start: 2025-07-26 | End: 2025-07-26 | Stop reason: HOSPADM

## 2025-07-26 RX ORDER — SODIUM CHLORIDE, SODIUM LACTATE, POTASSIUM CHLORIDE, CALCIUM CHLORIDE 600; 310; 30; 20 MG/100ML; MG/100ML; MG/100ML; MG/100ML
INJECTION, SOLUTION INTRAVENOUS CONTINUOUS PRN
Status: DISCONTINUED | OUTPATIENT
Start: 2025-07-26 | End: 2025-07-26

## 2025-07-26 RX ADMIN — PANTOPRAZOLE SODIUM 40 MG: 40 INJECTION, POWDER, FOR SOLUTION INTRAVENOUS at 00:17

## 2025-07-26 RX ADMIN — SODIUM CHLORIDE: 0.9 INJECTION, SOLUTION INTRAVENOUS at 04:01

## 2025-07-26 RX ADMIN — FLUTICASONE PROPIONATE 1 SPRAY: 50 SPRAY, METERED NASAL at 13:29

## 2025-07-26 RX ADMIN — LATANOPROST 1 DROP: 50 SOLUTION/ DROPS OPHTHALMIC at 21:37

## 2025-07-26 RX ADMIN — Medication 40 MCG/MIN: at 07:58

## 2025-07-26 RX ADMIN — PROPOFOL 10 MG: 10 INJECTION, EMULSION INTRAVENOUS at 07:31

## 2025-07-26 RX ADMIN — PROPOFOL 30 MG: 10 INJECTION, EMULSION INTRAVENOUS at 07:43

## 2025-07-26 RX ADMIN — PROPOFOL 30 MG: 10 INJECTION, EMULSION INTRAVENOUS at 07:37

## 2025-07-26 RX ADMIN — ONDANSETRON 4 MG: 2 INJECTION INTRAMUSCULAR; INTRAVENOUS at 07:41

## 2025-07-26 RX ADMIN — PHENYLEPHRINE HYDROCHLORIDE 150 MCG: 10 INJECTION INTRAVENOUS at 07:48

## 2025-07-26 RX ADMIN — PANTOPRAZOLE SODIUM 40 MG: 40 INJECTION, POWDER, FOR SOLUTION INTRAVENOUS at 21:32

## 2025-07-26 RX ADMIN — FLUTICASONE FUROATE AND VILANTEROL TRIFENATATE 1 PUFF: 100; 25 POWDER RESPIRATORY (INHALATION) at 13:29

## 2025-07-26 RX ADMIN — PHENYLEPHRINE HYDROCHLORIDE 200 MCG: 10 INJECTION INTRAVENOUS at 07:58

## 2025-07-26 RX ADMIN — PHENYLEPHRINE HYDROCHLORIDE 100 MCG: 10 INJECTION INTRAVENOUS at 08:02

## 2025-07-26 RX ADMIN — PROPOFOL 150 MCG/KG/MIN: 10 INJECTION, EMULSION INTRAVENOUS at 07:30

## 2025-07-26 RX ADMIN — PHENYLEPHRINE HYDROCHLORIDE 150 MCG: 10 INJECTION INTRAVENOUS at 07:44

## 2025-07-26 RX ADMIN — PHENYLEPHRINE HYDROCHLORIDE 100 MCG: 10 INJECTION INTRAVENOUS at 07:53

## 2025-07-26 RX ADMIN — SODIUM CHLORIDE, SODIUM LACTATE, POTASSIUM CHLORIDE, AND CALCIUM CHLORIDE: .6; .31; .03; .02 INJECTION, SOLUTION INTRAVENOUS at 07:27

## 2025-07-26 ASSESSMENT — ACTIVITIES OF DAILY LIVING (ADL)
ADLS_ACUITY_SCORE: 39
ADLS_ACUITY_SCORE: 42
ADLS_ACUITY_SCORE: 36
ADLS_ACUITY_SCORE: 40
ADLS_ACUITY_SCORE: 42
ADLS_ACUITY_SCORE: 44
ADLS_ACUITY_SCORE: 36
ADLS_ACUITY_SCORE: 40
ADLS_ACUITY_SCORE: 42
ADLS_ACUITY_SCORE: 42
ADLS_ACUITY_SCORE: 36
ADLS_ACUITY_SCORE: 44
ADLS_ACUITY_SCORE: 44
ADLS_ACUITY_SCORE: 42
ADLS_ACUITY_SCORE: 44
ADLS_ACUITY_SCORE: 39
ADLS_ACUITY_SCORE: 42
ADLS_ACUITY_SCORE: 42
ADLS_ACUITY_SCORE: 40
ADLS_ACUITY_SCORE: 42

## 2025-07-26 ASSESSMENT — COPD QUESTIONNAIRES: COPD: 1

## 2025-07-26 NOTE — ANESTHESIA CARE TRANSFER NOTE
Patient: Juan Luis Mims    Procedure: Procedure(s):  COLONOSCOPY       Diagnosis: Lower GI bleed [K92.2]  Diagnosis Additional Information: No value filed.    Anesthesia Type:   MAC     Note:    Oropharynx: oropharynx clear of all foreign objects and spontaneously breathing  Level of Consciousness: drowsy  Oxygen Supplementation: face mask  Level of Supplemental Oxygen (L/min / FiO2): 6  Independent Airway: airway patency satisfactory and stable  Dentition: dentition unchanged  Vital Signs Stable: post-procedure vital signs reviewed and stable  Report to RN Given: handoff report given  Patient transferred to: ICU    ICU Handoff: Call for PAUSE to initiate/utilize ICU HANDOFF, Identified Patient, Identified Responsible Provider, Reviewed the Pertinent Medical History, Discussed Surgical Course, Reviewed Intra-OP Anesthesia Management and Issues during Anesthesia, Set Expectations for Post Procedure Period and Allowed Opportunity for Questions and Acknowledgement of Understanding      Vitals:  Vitals Value Taken Time   /61 07/26/25 09:03   Temp     Pulse 93 07/26/25 09:11   Resp 12 07/26/25 09:11   SpO2 97 % 07/26/25 09:11   Vitals shown include unfiled device data.    Electronically Signed By: APRIL Worrell CRNA  July 26, 2025  8:42 AM

## 2025-07-26 NOTE — ANESTHESIA CARE TRANSFER NOTE
Patient: Juan Luis Mims    Procedure: Procedure(s):  COLONOSCOPY       Diagnosis: Lower GI bleed [K92.2]  Diagnosis Additional Information: No value filed.    Anesthesia Type:   MAC     Note:    Oropharynx: oropharynx clear of all foreign objects  Level of Consciousness: awake      Independent Airway: airway patency satisfactory and stable  Dentition: dentition unchanged  Vital Signs Stable: post-procedure vital signs reviewed and stable  Report to RN Given: handoff report given  Patient transferred to: Phase II    Handoff Report: Identifed the Patient, Identified the Reponsible Provider, Reviewed the pertinent medical history, Discussed the surgical course, Reviewed Intra-OP anesthesia mangement and issues during anesthesia, Set expectations for post-procedure period and Allowed opportunity for questions and acknowledgement of understanding      Vitals:  Vitals Value Taken Time   /59 07/26/25 08:57   Temp     Pulse 96 07/26/25 08:59   Resp 16 07/26/25 08:59   SpO2 99 % 07/26/25 08:59   Vitals shown include unfiled device data.    Electronically Signed By: Fam Mckinnon MD  July 26, 2025  9:00 AM

## 2025-07-26 NOTE — PROGRESS NOTES
Verbal sign out given on patient. Chart reviewed. I spoke to patient's  nurse. Ongoing GIB. Trend Hb and keep > 8. GI to see in am

## 2025-07-26 NOTE — PLAN OF CARE
[facility] - ICU    RN Progress Note:            Pertinent Assessments:      Please refer to flowsheet rows for full assessment     Alert and oriented times four, vital signs stable, no fever, one unit of blood given. Patient continue with Bowel prep for colonoscopy today.           Key Events - This Shift:       Uneventful     RN Managed Protocols Ordered:  Yes  Protocols:Potassium and Magnesium  PRN'S:  Protocols Status: Reviewed with Oncoming RN                Barriers to Discharge / Downgrade:     Waiting for colonoscopy today         Point of Contact Update: YES-OR-NO: Yes  If No, reason:   Name:  Phone Number:  Summary of Conversation:

## 2025-07-26 NOTE — H&P
Pre-procedure Note    Reason for procedure: lower Gi bleed    History and Physical Reviewed: Reviewed, no changes.    Pre-sedation assessment:    General: alert and cooperative  Airway: normal  Heart: regular rate  Lungs: normal respiratory effort     Sedation Plan based on assessment: MAC    Mallampati score: Class II (visualization of the soft palate, fauces, and uvula)          ASA Classification: ASA 3 - Patient with moderate systemic disease with functional limitations    Impression: Patient deemed adequate candidate for MAC sedation    Risks, benefits and alternatives were discussed with the patient and informed consent was obtained.    Plan: colonoscopy    Thank you for the opportunity to participate in the care of this patient. Please feel free to call with any questions or concerns.     Phone number [phone].     Philip Luna MD 7/26/2025 8:36 AM

## 2025-07-26 NOTE — ANESTHESIA POSTPROCEDURE EVALUATION
Patient: Juan Luis Mims    Procedure: Procedure(s):  COLONOSCOPY       Anesthesia Type:  MAC    Note:  Disposition: Inpatient   Postop Pain Control: Uneventful            Sign Out: Well controlled pain   PONV: No   Neuro/Psych: Uneventful            Sign Out: Acceptable/Baseline neuro status   Airway/Respiratory: Uneventful            Sign Out: Acceptable/Baseline resp. status   CV/Hemodynamics: Uneventful            Sign Out: Acceptable CV status; No obvious hypovolemia; No obvious fluid overload   Other NRE: NONE   DID A NON-ROUTINE EVENT OCCUR? No           Last vitals:  Vitals:    07/26/25 0530 07/26/25 0545 07/26/25 0600   BP: 116/56 112/59 119/61   Pulse: 112 110 105   Resp: 19 15 15   Temp:      SpO2: 98% 97% 95%       Electronically Signed By: Fam Mckinnon MD  July 26, 2025  8:41 AM

## 2025-07-26 NOTE — ANESTHESIA PREPROCEDURE EVALUATION
Anesthesia Pre-Procedure Evaluation    Patient: Juan Luis Mims   MRN: [id number] : 1955          Procedure : Procedure(s):  COLONOSCOPY         Past Medical History:   Diagnosis Date    COPD (chronic obstructive pulmonary disease) (H)     Kidney stone 2023    Sleep apnea       History reviewed. No pertinent surgical history.   No Known Allergies   Social History     Tobacco Use    Smoking status: Never    Smokeless tobacco: Never   Substance Use Topics    Alcohol use: Not Currently      Wt Readings from Last 1 Encounters:   25 85.4 kg (188 lb 3.2 oz)        Anesthesia Evaluation   Pt has not had prior anesthetic         ROS/MED HX  ENT/Pulmonary:     (+)                          COPD,              Neurologic:  - neg neurologic ROS     Cardiovascular:     (+) Dyslipidemia - -   -  - -                                      METS/Exercise Tolerance: >4 METS    Hematologic:     (+)      anemia,          Musculoskeletal:  - neg musculoskeletal ROS     GI/Hepatic: Comment: GI bleeding      Renal/Genitourinary:     (+)       Nephrolithiasis ,       Endo:  - neg endo ROS     Psychiatric/Substance Use:  - neg psychiatric ROS     Infectious Disease:  - neg infectious disease ROS     Malignancy:  - neg malignancy ROS     Other:  - neg other ROS            Physical Exam  Airway  Mallampati: II  TM distance: >3 FB  Neck ROM: full  Mouth opening: >= 4 cm    Cardiovascular - normal exam   Dental   (+) Minor Abnormalities - some fillings, tiny chips      Pulmonary - normal exam      Neurological - normal exam  He appears awake, alert and oriented x3.    Other Findings       OUTSIDE LABS:  CBC:   Lab Results   Component Value Date    WBC 7.7 2025    WBC 7.8 2025    HGB 8.3 (L) 2025    HGB 7.6 (L) 2025    HCT 26.5 (L) 2025    HCT 37.4 (L) 2025     (L) 2025     2025     BMP:   Lab Results   Component Value Date     2025     2025     "POTASSIUM 4.3 07/25/2025    POTASSIUM 3.9 07/24/2025    CHLORIDE 107 07/25/2025    CHLORIDE 108 (H) 07/24/2025    CO2 19 (L) 07/25/2025    CO2 20 (L) 07/24/2025    BUN 11.9 07/25/2025    BUN 18.7 07/24/2025    CR 0.85 07/25/2025    CR 0.96 07/24/2025     (H) 07/25/2025     (H) 07/25/2025     COAGS:   Lab Results   Component Value Date    INR 1.04 03/23/2020     POC: No results found for: \"BGM\", \"HCG\", \"HCGS\"  HEPATIC:   Lab Results   Component Value Date    ALBUMIN 4.0 06/20/2025    PROTTOTAL 6.9 06/20/2025    ALT 31 06/20/2025    AST 34 06/20/2025    ALKPHOS 86 06/20/2025    BILITOTAL 0.8 06/20/2025     OTHER:   Lab Results   Component Value Date    LACT 1.9 07/24/2025    A1C 6.1 05/17/2019    SUZE 7.8 (L) 07/25/2025    TSH 4.67 (H) 06/20/2025       Anesthesia Plan    ASA Status:  3, emergent      NPO Status: NPO Appropriate   Anesthesia Type: MAC.  Airway: natural airway.  Induction: intravenous.  Maintenance: TIVA.   Techniques and Equipment:       - Monitoring Plan: standard ASA monitoring     Consents    Anesthesia Plan(s) and associated risks, benefits, and realistic alternatives discussed. Questions answered and patient/representative(s) expressed understanding.     - Discussed: CRNA     - Discussed with:  Patient        - Pt is DNR/DNI Status: no DNR          Postoperative Care    Pain management: multimodal analgesia.     Comments:                   Fam Mckinnon MD    I have reviewed the pertinent notes and labs in the chart from the past 30 days and (re)examined the patient.  Any updates or changes from those notes are reflected in this note.    Clinically Significant Risk Factors Present on Admission          # Hyperchloremia: Highest Cl = 108 mmol/L in last 2 days, will monitor as appropriate                   # Anemia: based on hgb <11  # Anemia: based on hgb <11       # Overweight: Estimated body mass index is 29.48 kg/m  as calculated from the following:    Height as of this " "encounter: 1.702 m (5' 7\").    Weight as of this encounter: 85.4 kg (188 lb 3.2 oz).       # Financial/Environmental Concerns: none               "

## 2025-07-26 NOTE — PROGRESS NOTES
"[facility]    Medicine Progress Note - Hospitalist Service    Date of Admission:  7/24/2025    Assessment & Plan      Juan Luis Mims is a 70 year old male admitted on 7/24/2025. He came to the ED for evaluation of rectal bleed    #Acute lower GI bleed, likely diverticular bleed.  Mesenteric CT on admission and repeated on 7/25 without active bleeding.  Colonoscopy 7/26 showed extensive diverticulosis of transverse, descending and sigmoid colon's, without active bleeding, although colon prep was not optimal.  No history of potential lower GI bleed.  D/W patient clinical presentation dynamics of diverticular bleed, and with recurrences may need surgical treatment.  - Consider colorectal surgery consult, if rectal bleed recurs    #Acute blood loss anemia baseline hemoglobin 12, upon visitation was 10.6.  It dropped down to 7.3, patient was symptomatic, with syncopal episode.  - S/p total 3 units PRBCs  - Monitor hemoglobin every 8 hours, transfuse to keep hemoglobin above 8  - Protonix per GI    #Syncope and collapse on 7/25, likely due to orthostatic hypotension/from GI bleed.  #Left eighth rib fracture, from the fall.  - No other traumatic injuries.  - Pain management  - I-S    #COPD without exacerbation  - Recent diagnosis, lifetime non-smoker  - Continue PTA Advair, fluticasone nasal spray, fexofenadine  - DuoNebs as needed    Diet: Clear Liquid Diet    DVT Prophylaxis: Anti-embolisim stockings (TEDs)  Mars Catheter: Not present  Lines: None     Cardiac Monitoring: None  Code Status: Full Code      Clinically Significant Risk Factors          # Hyperchloremia: Highest Cl = 108 mmol/L in last 2 days, will monitor as appropriate                         # Overweight: Estimated body mass index is 29.48 kg/m  as calculated from the following:    Height as of this encounter: 1.702 m (5' 7\").    Weight as of this encounter: 85.4 kg (188 lb 3.2 oz)., PRESENT ON ADMISSION     # " Financial/Environmental Concerns: none      Social Drivers of Health      Disposition Plan     Medically Ready for Discharge: Anticipated Tomorrow    Monika De La Garza MD  Hospitalist Service  [facility]  Securely message with [employer] (more info)  Text page via [employer] Paging/Directory   ______________________________________________________________________    Interval History   Overnight events reviewed.  Patient had syncopal episode while in short stay unit.  Sustained left eighth rib fracture.  CT head and neck negative for traumatic injuries.  Mesenteric CTA negative for active hemorrhage.  He colonoscopy today, which showed extensive diverticulosis, no active bleeding.  Known hemoglobin 7.3, down from 8.3 this morning.  Given 2 units PRBCs.  Denies chest pain, cardiac operations, nausea.  Tolerating clear liquid diet    Physical Exam   Vital Signs: Temp: 98.6  F (37  C) Temp src: Oral BP: 112/54 Pulse: 95   Resp: 18 SpO2: 99 % O2 Device: None (Room air)    Weight: 188 lbs 3.2 oz  General: Alert and oriented x 3. Not in obvious distress.  HEENT: NC, AT. Neck- supple, No JVP elevation, lymphadenopathy or thyromegaly. Trachea-central.  Chest: Clear to auscultation bilaterally.  Heart: S1S2 regular. No M/R/G.  Abdomen: Soft. NT, ND. No organomegaly. Bowel sounds- active.  Back: No spine tenderness. No CVA tenderness.  Extremities: No leg swelling. Peripheral pulses 2+ bilaterally.  Neuro: Cranial nerves 1-12 grossly normal. No focal neurological deficit    Medical Decision Making       51 MINUTES SPENT BY ME on the date of service doing chart review, history, exam, documentation & further activities per the note.      Data     I have personally reviewed the following data over the past 24 hrs:    7.7  \   7.3 (L)   / 143 (L)     137 107 11.9 /  127 (H)   4.3 19 (L) 0.85 \       Imaging results reviewed over the past 24 hrs:   Recent Results (from the past 24 hours)   CT Head w/o Contrast     Narrative    EXAM: CT HEAD W/O CONTRAST  LOCATION: [facility]  DATE: 07/25/2025    INDICATION: Fall, questionable head impact.  COMPARISON: Brain MRI 07/11/2010.  TECHNIQUE: Routine CT Head without IV contrast. Multiplanar reformats. Dose reduction techniques were used.    FINDINGS:  INTRACRANIAL CONTENTS: Bilateral basal ganglia calcifications. No intracranial hemorrhage, extra-axial collection, or mass effect. No CT evidence of acute infarct. Normal parenchymal attenuation. Normal ventricles and sulci.     VISUALIZED ORBITS/SINUSES/MASTOIDS: No intraorbital abnormality. No paranasal sinus mucosal disease. No middle ear or mastoid effusion.    BONES/SOFT TISSUES: No acute abnormality.      Impression    IMPRESSION:  1.  Normal head CT.   CT Cervical Spine w/o Contrast    Narrative    EXAM: CT CERVICAL SPINE W/O CONTRAST  LOCATION: [facility]  DATE: 7/25/2025    INDICATION: Fall, question head impact.  COMPARISON: None.  TECHNIQUE: Routine CT Cervical Spine without IV contrast. Multiplanar reformats. Dose reduction techniques were used.      Impression    IMPRESSION: There is normal alignment of the cervical vertebrae; however, there is reversal of normal cervical lordosis centered at the C3 level. Vertebral body heights of the cervical spine are normal. Craniocervical alignment is normal. No fractures.   There is loss of disc space height and degenerative endplate spurring at C5-C6. Minimal facet arthropathy throughout the cervical spine. Minimal posterior disc bulging at C2-C3, C3-C4 and C4-C5. No spinal canal stenosis. No prevertebral soft tissue   swelling.   CTA GI Bleed    Narrative    EXAM: CTA GI BLEED  LOCATION: [facility]  DATE: 7/25/2025    INDICATION: Hematochezia ongoing with syncopal event  COMPARISON: CT angiogram 7/24/2025  TECHNIQUE: CT angiogram abdomen pelvis during arterial phase of injection of IV contrast. 2D and 3D MIP  reconstructions were performed by the CT technologist. Dose reduction techniques were used.  CONTRAST: ISOVUE 370 75ML    FINDINGS:  ANGIOGRAM ABDOMEN/PELVIS: There is no evidence of abdominal aortic aneurysm or dissection. Mild calcified and noncalcified atherosclerosis. The celiac, superior mesenteric, bilateral renal and inferior mesenteric arteries widely patent. No evidence of   hemodynamically significant stenosis in the pelvis. No evidence of active peritoneal or retroperitoneal hemorrhage. Specifically, no evidence of active, intraluminal gastrointestinal hemorrhage.    LOWER CHEST: Unremarkable.    HEPATOBILIARY: Cholelithiasis without evidence of acute cholecystitis or biliary obstruction.    PANCREAS: Normal.    SPLEEN: Multiple calcified splenic granulomas.    ADRENAL GLANDS: Normal.    KIDNEYS/BLADDER: Multiple nonobstructing right renal calculi. No ureterolithiasis or hydronephrosis bilaterally. Urinary bladder is unremarkable.    BOWEL: Fluid is noted throughout the length of the colon. Scattered diverticuli without evidence of acute diverticulitis. No definite evidence of active, intraluminal gastrointestinal hemorrhage. Of note, there are several hyperdense foci in the   descending and sigmoid colon which are unchanged between pre and postcontrast images. No evidence of mechanical bowel obstruction or acute inflammation.    LYMPH NODES: No suspicious lymphadenopathy. No abdominopelvic free fluid.    PELVIC ORGANS: Mild prostatomegaly.    MUSCULOSKELETAL: Minimally displaced, acute-appearing left lateral eighth rib fracture (series 10 image 79). No additional acute bony abnormality.      Impression    IMPRESSION:  1.  Fluid throughout the length of the colon, suggestive of diarrheal illness. No definite active, intraluminal gastrointestinal hemorrhage.  2.  Minimally displaced, acute-appearing left lateral eighth rib fracture.   This document is created with the help of Dragon dictation system. All  grammatical errors are unintentional.

## 2025-07-27 ENCOUNTER — APPOINTMENT (OUTPATIENT)
Dept: CT IMAGING | Facility: HOSPITAL | Age: 70
DRG: 356 | End: 2025-07-27
Payer: MEDICARE

## 2025-07-27 ENCOUNTER — APPOINTMENT (OUTPATIENT)
Dept: INTERVENTIONAL RADIOLOGY/VASCULAR | Facility: HOSPITAL | Age: 70
End: 2025-07-27
Attending: RADIOLOGY
Payer: MEDICARE

## 2025-07-27 ENCOUNTER — APPOINTMENT (OUTPATIENT)
Dept: RADIOLOGY | Facility: HOSPITAL | Age: 70
DRG: 356 | End: 2025-07-27
Attending: INTERNAL MEDICINE
Payer: MEDICARE

## 2025-07-27 LAB
ALBUMIN SERPL BCG-MCNC: 2.7 G/DL (ref 3.5–5.2)
ALP SERPL-CCNC: 43 U/L (ref 40–150)
ALT SERPL W P-5'-P-CCNC: 16 U/L (ref 0–70)
ANION GAP SERPL CALCULATED.3IONS-SCNC: 3 MMOL/L (ref 7–15)
ANION GAP SERPL CALCULATED.3IONS-SCNC: 7 MMOL/L (ref 7–15)
APTT PPP: 31 SECONDS (ref 22–38)
AST SERPL W P-5'-P-CCNC: 22 U/L (ref 0–45)
ATRIAL RATE - MUSE: 95 BPM
BILIRUB SERPL-MCNC: 0.6 MG/DL
BLD PROD TYP BPU: NORMAL
BLOOD COMPONENT TYPE: NORMAL
BUN SERPL-MCNC: 9.5 MG/DL (ref 8–23)
CA-I BLD-MCNC: 3.3 MG/DL (ref 4.4–5.2)
CA-I BLD-MCNC: 4.1 MG/DL (ref 4.4–5.2)
CALCIUM SERPL-MCNC: 7.5 MG/DL (ref 8.8–10.4)
CHLORIDE SERPL-SCNC: 108 MMOL/L (ref 98–107)
CHLORIDE SERPL-SCNC: 109 MMOL/L (ref 98–107)
CODING SYSTEM: NORMAL
CREAT SERPL-MCNC: 1.08 MG/DL (ref 0.67–1.17)
CROSSMATCH: NORMAL
DIASTOLIC BLOOD PRESSURE - MUSE: NORMAL MMHG
EGFRCR SERPLBLD CKD-EPI 2021: 75 ML/MIN/1.73M2
ERYTHROCYTE [DISTWIDTH] IN BLOOD BY AUTOMATED COUNT: 14.1 % (ref 10–15)
ERYTHROCYTE [DISTWIDTH] IN BLOOD BY AUTOMATED COUNT: 15.2 % (ref 10–15)
ERYTHROCYTE [DISTWIDTH] IN BLOOD BY AUTOMATED COUNT: 15.5 % (ref 10–15)
FIBRINOGEN PPP-MCNC: 208 MG/DL (ref 170–510)
GLUCOSE BLDC GLUCOMTR-MCNC: 120 MG/DL (ref 70–99)
GLUCOSE BLDC GLUCOMTR-MCNC: 126 MG/DL (ref 70–99)
GLUCOSE SERPL-MCNC: 124 MG/DL (ref 70–99)
HCO3 SERPL-SCNC: 24 MMOL/L (ref 22–29)
HCO3 SERPL-SCNC: 28 MMOL/L (ref 22–29)
HCT VFR BLD AUTO: 24.3 % (ref 40–53)
HCT VFR BLD AUTO: 27.3 % (ref 40–53)
HCT VFR BLD AUTO: 29.3 % (ref 40–53)
HGB BLD-MCNC: 8 G/DL (ref 13.3–17.7)
HGB BLD-MCNC: 8 G/DL (ref 13.3–17.7)
HGB BLD-MCNC: 8.3 G/DL (ref 13.3–17.7)
HGB BLD-MCNC: 9.1 G/DL (ref 13.3–17.7)
HGB BLD-MCNC: 9.6 G/DL (ref 13.3–17.7)
HGB BLD-MCNC: 9.7 G/DL (ref 13.3–17.7)
HGB BLD-MCNC: 9.7 G/DL (ref 13.3–17.7)
HGB BLD-MCNC: 9.8 G/DL (ref 13.3–17.7)
INR PPP: 1.24 (ref 0.85–1.15)
INTERPRETATION ECG - MUSE: NORMAL
ISSUE DATE AND TIME: NORMAL
MAGNESIUM SERPL-MCNC: 1.5 MG/DL (ref 1.7–2.3)
MAGNESIUM SERPL-MCNC: 1.6 MG/DL (ref 1.7–2.3)
MCH RBC QN AUTO: 29 PG (ref 26.5–33)
MCH RBC QN AUTO: 29.2 PG (ref 26.5–33)
MCH RBC QN AUTO: 30.2 PG (ref 26.5–33)
MCHC RBC AUTO-ENTMCNC: 32.9 G/DL (ref 31.5–36.5)
MCHC RBC AUTO-ENTMCNC: 33.1 G/DL (ref 31.5–36.5)
MCHC RBC AUTO-ENTMCNC: 35.9 G/DL (ref 31.5–36.5)
MCV RBC AUTO: 84 FL (ref 78–100)
MCV RBC AUTO: 84 FL (ref 78–100)
MCV RBC AUTO: 86 FL (ref 78–100)
MCV RBC AUTO: 88 FL (ref 78–100)
MCV RBC AUTO: 88 FL (ref 78–100)
MCV RBC AUTO: 89 FL (ref 78–100)
P AXIS - MUSE: 29 DEGREES
PHOSPHATE SERPL-MCNC: 2.3 MG/DL (ref 2.5–4.5)
PHOSPHATE SERPL-MCNC: 2.9 MG/DL (ref 2.5–4.5)
PLATELET # BLD AUTO: 102 10E3/UL (ref 150–450)
PLATELET # BLD AUTO: 128 10E3/UL (ref 150–450)
PLATELET # BLD AUTO: 98 10E3/UL (ref 150–450)
POTASSIUM SERPL-SCNC: 3.1 MMOL/L (ref 3.4–5.3)
POTASSIUM SERPL-SCNC: 3.5 MMOL/L (ref 3.4–5.3)
POTASSIUM SERPL-SCNC: 3.5 MMOL/L (ref 3.4–5.3)
POTASSIUM SERPL-SCNC: 3.6 MMOL/L (ref 3.4–5.3)
POTASSIUM SERPL-SCNC: 3.8 MMOL/L (ref 3.4–5.3)
PR INTERVAL - MUSE: 146 MS
PROT SERPL-MCNC: 4.2 G/DL (ref 6.4–8.3)
PROTHROMBIN TIME: 15.8 SECONDS (ref 11.8–14.8)
QRS DURATION - MUSE: 72 MS
QT - MUSE: 390 MS
QTC - MUSE: 490 MS
R AXIS - MUSE: -3 DEGREES
RADIOLOGIST FLAGS: ABNORMAL
RBC # BLD AUTO: 2.74 10E6/UL (ref 4.4–5.9)
RBC # BLD AUTO: 3.24 10E6/UL (ref 4.4–5.9)
RBC # BLD AUTO: 3.35 10E6/UL (ref 4.4–5.9)
SODIUM SERPL-SCNC: 139 MMOL/L (ref 135–145)
SODIUM SERPL-SCNC: 140 MMOL/L (ref 135–145)
SYSTOLIC BLOOD PRESSURE - MUSE: NORMAL MMHG
T AXIS - MUSE: 77 DEGREES
UNIT ABO/RH: NORMAL
UNIT NUMBER: NORMAL
UNIT STATUS: NORMAL
UNIT TYPE ISBT: 5100
UNIT TYPE ISBT: 6200
UNIT TYPE ISBT: 7300
UNIT TYPE ISBT: 9500
VENTRICULAR RATE- MUSE: 95 BPM
WBC # BLD AUTO: 11.4 10E3/UL (ref 4–11)
WBC # BLD AUTO: 7.6 10E3/UL (ref 4–11)
WBC # BLD AUTO: 8.3 10E3/UL (ref 4–11)

## 2025-07-27 PROCEDURE — P9016 RBC LEUKOCYTES REDUCED: HCPCS

## 2025-07-27 PROCEDURE — 272N000566 HC SHEATH CR3

## 2025-07-27 PROCEDURE — 84132 ASSAY OF SERUM POTASSIUM: CPT | Performed by: INTERNAL MEDICINE

## 2025-07-27 PROCEDURE — 99291 CRITICAL CARE FIRST HOUR: CPT | Performed by: INTERNAL MEDICINE

## 2025-07-27 PROCEDURE — 36247 INS CATH ABD/L-EXT ART 3RD: CPT | Mod: XS

## 2025-07-27 PROCEDURE — 74174 CTA ABD&PLVS W/CONTRAST: CPT

## 2025-07-27 PROCEDURE — 250N000011 HC RX IP 250 OP 636: Performed by: INTERNAL MEDICINE

## 2025-07-27 PROCEDURE — P9012 CRYOPRECIPITATE EACH UNIT: HCPCS | Performed by: INTERNAL MEDICINE

## 2025-07-27 PROCEDURE — P9016 RBC LEUKOCYTES REDUCED: HCPCS | Performed by: INTERNAL MEDICINE

## 2025-07-27 PROCEDURE — 75774 ARTERY X-RAY EACH VESSEL: CPT

## 2025-07-27 PROCEDURE — 272N000117 HC CATH CR2

## 2025-07-27 PROCEDURE — 99233 SBSQ HOSP IP/OBS HIGH 50: CPT | Performed by: INTERNAL MEDICINE

## 2025-07-27 PROCEDURE — 272N000500 HC NEEDLE CR2

## 2025-07-27 PROCEDURE — 36415 COLL VENOUS BLD VENIPUNCTURE: CPT | Performed by: INTERNAL MEDICINE

## 2025-07-27 PROCEDURE — 258N000003 HC RX IP 258 OP 636

## 2025-07-27 PROCEDURE — 04L33DZ OCCLUSION OF HEPATIC ARTERY WITH INTRALUMINAL DEVICE, PERCUTANEOUS APPROACH: ICD-10-PCS | Performed by: RADIOLOGY

## 2025-07-27 PROCEDURE — 85014 HEMATOCRIT: CPT | Performed by: INTERNAL MEDICINE

## 2025-07-27 PROCEDURE — C1889 IMPLANT/INSERT DEVICE, NOC: HCPCS

## 2025-07-27 PROCEDURE — 85610 PROTHROMBIN TIME: CPT | Performed by: INTERNAL MEDICINE

## 2025-07-27 PROCEDURE — 82330 ASSAY OF CALCIUM: CPT | Performed by: INTERNAL MEDICINE

## 2025-07-27 PROCEDURE — 250N000011 HC RX IP 250 OP 636: Performed by: RADIOLOGY

## 2025-07-27 PROCEDURE — 75726 ARTERY X-RAYS ABDOMEN: CPT

## 2025-07-27 PROCEDURE — C1887 CATHETER, GUIDING: HCPCS

## 2025-07-27 PROCEDURE — 250N000009 HC RX 250: Performed by: INTERNAL MEDICINE

## 2025-07-27 PROCEDURE — P9059 PLASMA, FRZ BETWEEN 8-24HOUR: HCPCS | Performed by: INTERNAL MEDICINE

## 2025-07-27 PROCEDURE — 80051 ELECTROLYTE PANEL: CPT | Performed by: INTERNAL MEDICINE

## 2025-07-27 PROCEDURE — C1769 GUIDE WIRE: HCPCS

## 2025-07-27 PROCEDURE — 83735 ASSAY OF MAGNESIUM: CPT | Performed by: INTERNAL MEDICINE

## 2025-07-27 PROCEDURE — 85018 HEMOGLOBIN: CPT | Performed by: INTERNAL MEDICINE

## 2025-07-27 PROCEDURE — 93010 ELECTROCARDIOGRAM REPORT: CPT | Mod: HIP | Performed by: INTERNAL MEDICINE

## 2025-07-27 PROCEDURE — 36248 INS CATH ABD/L-EXT ART ADDL: CPT

## 2025-07-27 PROCEDURE — P9035 PLATELET PHERES LEUKOREDUCED: HCPCS | Performed by: INTERNAL MEDICINE

## 2025-07-27 PROCEDURE — 99207 PR NO BILLABLE SERVICE THIS VISIT: CPT | Performed by: INTERNAL MEDICINE

## 2025-07-27 PROCEDURE — 99153 MOD SED SAME PHYS/QHP EA: CPT

## 2025-07-27 PROCEDURE — 999N000157 HC STATISTIC RCP TIME EA 10 MIN

## 2025-07-27 PROCEDURE — 71045 X-RAY EXAM CHEST 1 VIEW: CPT

## 2025-07-27 PROCEDURE — 93005 ELECTROCARDIOGRAM TRACING: CPT

## 2025-07-27 PROCEDURE — 85384 FIBRINOGEN ACTIVITY: CPT | Performed by: INTERNAL MEDICINE

## 2025-07-27 PROCEDURE — B41B1ZZ FLUOROSCOPY OF OTHER INTRA-ABDOMINAL ARTERIES USING LOW OSMOLAR CONTRAST: ICD-10-PCS | Performed by: RADIOLOGY

## 2025-07-27 PROCEDURE — 37244 VASC EMBOLIZE/OCCLUDE BLEED: CPT

## 2025-07-27 PROCEDURE — 85027 COMPLETE CBC AUTOMATED: CPT | Performed by: INTERNAL MEDICINE

## 2025-07-27 PROCEDURE — C1760 CLOSURE DEV, VASC: HCPCS

## 2025-07-27 PROCEDURE — 200N000001 HC R&B ICU

## 2025-07-27 PROCEDURE — 85730 THROMBOPLASTIN TIME PARTIAL: CPT | Performed by: INTERNAL MEDICINE

## 2025-07-27 PROCEDURE — 255N000002 HC RX 255 OP 636: Performed by: RADIOLOGY

## 2025-07-27 PROCEDURE — 250N000011 HC RX IP 250 OP 636

## 2025-07-27 PROCEDURE — 84100 ASSAY OF PHOSPHORUS: CPT | Performed by: INTERNAL MEDICINE

## 2025-07-27 PROCEDURE — 80053 COMPREHEN METABOLIC PANEL: CPT | Performed by: INTERNAL MEDICINE

## 2025-07-27 RX ORDER — CALCIUM GLUCONATE 20 MG/ML
INJECTION, SOLUTION INTRAVENOUS
Status: DISCONTINUED
Start: 2025-07-27 | End: 2025-07-27 | Stop reason: HOSPADM

## 2025-07-27 RX ORDER — FUROSEMIDE 10 MG/ML
20 INJECTION INTRAMUSCULAR; INTRAVENOUS ONCE
Status: COMPLETED | OUTPATIENT
Start: 2025-07-27 | End: 2025-07-27

## 2025-07-27 RX ORDER — CALCIUM GLUCONATE 20 MG/ML
2 INJECTION, SOLUTION INTRAVENOUS ONCE
Status: COMPLETED | OUTPATIENT
Start: 2025-07-27 | End: 2025-07-27

## 2025-07-27 RX ORDER — NALOXONE HYDROCHLORIDE 0.4 MG/ML
0.4 INJECTION, SOLUTION INTRAMUSCULAR; INTRAVENOUS; SUBCUTANEOUS
Status: DISCONTINUED | OUTPATIENT
Start: 2025-07-27 | End: 2025-07-28

## 2025-07-27 RX ORDER — FUROSEMIDE 10 MG/ML
INJECTION INTRAMUSCULAR; INTRAVENOUS
Status: COMPLETED
Start: 2025-07-27 | End: 2025-07-27

## 2025-07-27 RX ORDER — POTASSIUM CHLORIDE 29.8 MG/ML
20 INJECTION INTRAVENOUS ONCE
Status: DISCONTINUED | OUTPATIENT
Start: 2025-07-27 | End: 2025-07-27

## 2025-07-27 RX ORDER — NICOTINE POLACRILEX 4 MG
15-30 LOZENGE BUCCAL
Status: DISCONTINUED | OUTPATIENT
Start: 2025-07-27 | End: 2025-07-28

## 2025-07-27 RX ORDER — NALOXONE HYDROCHLORIDE 0.4 MG/ML
0.2 INJECTION, SOLUTION INTRAMUSCULAR; INTRAVENOUS; SUBCUTANEOUS
Status: DISCONTINUED | OUTPATIENT
Start: 2025-07-27 | End: 2025-07-28

## 2025-07-27 RX ORDER — ALBUTEROL SULFATE 0.83 MG/ML
2.5 SOLUTION RESPIRATORY (INHALATION) EVERY 4 HOURS PRN
Status: DISCONTINUED | OUTPATIENT
Start: 2025-07-27 | End: 2025-07-31 | Stop reason: HOSPADM

## 2025-07-27 RX ORDER — NOREPINEPHRINE BITARTRATE 0.02 MG/ML
.01-.6 INJECTION, SOLUTION INTRAVENOUS CONTINUOUS
Status: DISCONTINUED | OUTPATIENT
Start: 2025-07-27 | End: 2025-07-28

## 2025-07-27 RX ORDER — FLUMAZENIL 0.1 MG/ML
0.2 INJECTION, SOLUTION INTRAVENOUS
Status: DISCONTINUED | OUTPATIENT
Start: 2025-07-27 | End: 2025-07-28

## 2025-07-27 RX ORDER — POTASSIUM CHLORIDE 29.8 MG/ML
20 INJECTION INTRAVENOUS
Status: COMPLETED | OUTPATIENT
Start: 2025-07-27 | End: 2025-07-27

## 2025-07-27 RX ORDER — NOREPINEPHRINE BITARTRATE 0.02 MG/ML
INJECTION, SOLUTION INTRAVENOUS
Status: COMPLETED
Start: 2025-07-27 | End: 2025-07-27

## 2025-07-27 RX ORDER — FENTANYL CITRATE 50 UG/ML
25-50 INJECTION, SOLUTION INTRAMUSCULAR; INTRAVENOUS EVERY 5 MIN PRN
Refills: 0 | Status: DISCONTINUED | OUTPATIENT
Start: 2025-07-27 | End: 2025-07-28

## 2025-07-27 RX ORDER — MAGNESIUM SULFATE HEPTAHYDRATE 40 MG/ML
2 INJECTION, SOLUTION INTRAVENOUS ONCE
Status: COMPLETED | OUTPATIENT
Start: 2025-07-27 | End: 2025-07-27

## 2025-07-27 RX ORDER — LIDOCAINE 40 MG/G
CREAM TOPICAL
Status: ACTIVE | OUTPATIENT
Start: 2025-07-27 | End: 2025-07-30

## 2025-07-27 RX ORDER — DEXTROSE MONOHYDRATE 25 G/50ML
25-50 INJECTION, SOLUTION INTRAVENOUS
Status: DISCONTINUED | OUTPATIENT
Start: 2025-07-27 | End: 2025-07-28

## 2025-07-27 RX ORDER — IOPAMIDOL 755 MG/ML
90 INJECTION, SOLUTION INTRAVASCULAR ONCE
Status: COMPLETED | OUTPATIENT
Start: 2025-07-27 | End: 2025-07-27

## 2025-07-27 RX ORDER — LIDOCAINE HYDROCHLORIDE 20 MG/ML
JELLY TOPICAL ONCE
Status: COMPLETED | OUTPATIENT
Start: 2025-07-27 | End: 2025-07-27

## 2025-07-27 RX ORDER — ROPIVACAINE IN 0.9% SOD CHL/PF 0.1 %
.01-.2 PLASTIC BAG, INJECTION (ML) EPIDURAL CONTINUOUS
Status: DISCONTINUED | OUTPATIENT
Start: 2025-07-27 | End: 2025-07-27

## 2025-07-27 RX ORDER — POTASSIUM CHLORIDE 29.8 MG/ML
20 INJECTION INTRAVENOUS ONCE
Status: COMPLETED | OUTPATIENT
Start: 2025-07-27 | End: 2025-07-27

## 2025-07-27 RX ADMIN — CALCIUM GLUCONATE 2 G: 20 INJECTION, SOLUTION INTRAVENOUS at 19:38

## 2025-07-27 RX ADMIN — FENTANYL CITRATE 25 MCG: 50 INJECTION INTRAMUSCULAR; INTRAVENOUS at 13:02

## 2025-07-27 RX ADMIN — FLUTICASONE PROPIONATE 1 SPRAY: 50 SPRAY, METERED NASAL at 11:59

## 2025-07-27 RX ADMIN — LATANOPROST 1 DROP: 50 SOLUTION/ DROPS OPHTHALMIC at 21:49

## 2025-07-27 RX ADMIN — POTASSIUM CHLORIDE 20 MEQ: 29.8 INJECTION, SOLUTION INTRAVENOUS at 22:22

## 2025-07-27 RX ADMIN — IOPAMIDOL 90 ML: 755 INJECTION, SOLUTION INTRAVENOUS at 10:06

## 2025-07-27 RX ADMIN — POTASSIUM CHLORIDE 20 MEQ: 29.8 INJECTION, SOLUTION INTRAVENOUS at 17:51

## 2025-07-27 RX ADMIN — Medication 0.03 MCG/KG/MIN: at 10:45

## 2025-07-27 RX ADMIN — POTASSIUM CHLORIDE 20 MEQ: 29.8 INJECTION, SOLUTION INTRAVENOUS at 16:42

## 2025-07-27 RX ADMIN — FLUTICASONE FUROATE AND VILANTEROL TRIFENATATE 1 PUFF: 100; 25 POWDER RESPIRATORY (INHALATION) at 11:58

## 2025-07-27 RX ADMIN — SODIUM CHLORIDE 1000 ML: 9 INJECTION, SOLUTION INTRAVENOUS at 10:00

## 2025-07-27 RX ADMIN — FUROSEMIDE 20 MG: 10 INJECTION, SOLUTION INTRAMUSCULAR; INTRAVENOUS at 12:13

## 2025-07-27 RX ADMIN — PANTOPRAZOLE SODIUM 40 MG: 40 INJECTION, POWDER, FOR SOLUTION INTRAVENOUS at 21:49

## 2025-07-27 RX ADMIN — MIDAZOLAM HYDROCHLORIDE 0.5 MG: 1 INJECTION, SOLUTION INTRAMUSCULAR; INTRAVENOUS at 13:18

## 2025-07-27 RX ADMIN — IOHEXOL 40 ML: 350 INJECTION, SOLUTION INTRAVENOUS at 13:38

## 2025-07-27 RX ADMIN — LIDOCAINE HYDROCHLORIDE: 20 JELLY TOPICAL at 12:19

## 2025-07-27 RX ADMIN — MAGNESIUM SULFATE HEPTAHYDRATE 2 G: 2 INJECTION, SOLUTION INTRAVENOUS at 16:41

## 2025-07-27 RX ADMIN — FUROSEMIDE 20 MG: 10 INJECTION, SOLUTION INTRAMUSCULAR; INTRAVENOUS at 13:00

## 2025-07-27 RX ADMIN — MIDAZOLAM HYDROCHLORIDE 1 MG: 1 INJECTION, SOLUTION INTRAMUSCULAR; INTRAVENOUS at 13:00

## 2025-07-27 RX ADMIN — NOREPINEPHRINE BITARTRATE 0.03 MCG/KG/MIN: 0.02 INJECTION, SOLUTION INTRAVENOUS at 10:45

## 2025-07-27 RX ADMIN — CALCIUM GLUCONATE 2 G: 20 INJECTION, SOLUTION INTRAVENOUS at 12:00

## 2025-07-27 RX ADMIN — FENTANYL CITRATE 25 MCG: 50 INJECTION INTRAMUSCULAR; INTRAVENOUS at 13:20

## 2025-07-27 ASSESSMENT — ACTIVITIES OF DAILY LIVING (ADL)
ADLS_ACUITY_SCORE: 39
ADLS_ACUITY_SCORE: 39
ADLS_ACUITY_SCORE: 40
ADLS_ACUITY_SCORE: 39
ADLS_ACUITY_SCORE: 46
ADLS_ACUITY_SCORE: 40
ADLS_ACUITY_SCORE: 40
ADLS_ACUITY_SCORE: 39
ADLS_ACUITY_SCORE: 40
ADLS_ACUITY_SCORE: 39
ADLS_ACUITY_SCORE: 40
ADLS_ACUITY_SCORE: 39
ADLS_ACUITY_SCORE: 39
ADLS_ACUITY_SCORE: 46
ADLS_ACUITY_SCORE: 39
ADLS_ACUITY_SCORE: 39
ADLS_ACUITY_SCORE: 40
ADLS_ACUITY_SCORE: 39
ADLS_ACUITY_SCORE: 40
ADLS_ACUITY_SCORE: 39
ADLS_ACUITY_SCORE: 40

## 2025-07-27 NOTE — PROGRESS NOTES
[facility] - ICU    RN Progress Note:            Pertinent Assessments:      Please refer to flowsheet rows for full assessment     Patient up to BR with 2 assist, had large loose bloody rectal output.  Placed in chair, almost limp, SBP 60's.   Rapid respond called. Pt lifted back to bed. Continues to have large   Bloody rectal output with clots.  IVF bolus given, MTP x 2 given, levophed gtt started.  Stat CTA done, see report.  DR Talley place central line.   Emergent IR treatment. See report.

## 2025-07-27 NOTE — PROCEDURES
RN-Vascular Access Note:    S: PICC ordered for pt in ICU for multiple infusions and blood products  B: pt has gi bleed, 2 patent piv at this time  A: Pt appropriate for PICC placement, in house PICC team unable to place at time ordered/paged due to high volume requests.  R: Eta approx 2 hours.    Emperatriz Chiu RN, [facility], [place]  Vascular Access - [facility]    10:30 Team now available but MD placing CVC at bedside, PICC no longer indicated at this time.

## 2025-07-27 NOTE — PROGRESS NOTES
Pulmonary/Critical Care Medicine update    Visceral angiography completed with IR, SMA angiography demonstrated active bleeding in the region of the hepatic flexure, which correlates with CT findings. Ascending colic artery angiography confirms active bleeding from a lateral branch.  Active bleeding from 2 separate mucosal arteries from the ascending colic artery/hepatic flexure which were successfully treated with coil embolization.     Today patient received following infusions:   - 7 units pRBC  - 4 units FFP  - 2 units platelets  - 2 units cryoprecipitate    Norepinephrine infusion is currently off.     Plan:  - Hgb Q4H  - Repeat iCa at 6 pm.  - Follow-up labs in AM ordered  - Explained to patient that he will continue to pass bloody stool as he clears the hematochezia/melena.   - Called partner, left voicemail message.    Katiuska Talley MD, MPH  Pulmonary & Critical Care Medicine  07/27/2025  4:00 PM

## 2025-07-27 NOTE — PROCEDURES
Interventional Radiology Post-Procedure Note   ?   Brief Procedure Note:   Patient name: Juan Luis Mims Pt MRN:[id number]   Date of procedure: 7/27/2025     Procedure(s): Mesenteric angiogram with embolization of hepatic flexure branches  Sedation method: Moderate sedation was employed. The patient was monitored by an interventional radiology nurse at all times throughout the procedure under my direct guidance.  Pre Procedure Diagnosis: Active GI bleeding  Post Procedure Diagnosis: Same  Indications: Active GI bleeding with extravasation on CTA and massive transfusion protocol   ?   Attending: Marv Mcadams M.D.  Specimen(s) removed: None   Additional studies ordered: None  Drains: None   Estimated Blood Loss: Minimal  Complications: None  Vascular closure method: 6 Fr VIP Angioseal   Findings/Notes/Comments: Mesenteric angiography confirms active bleeding from two branches supplying the hepatic flexure. These bleeding branches were successfully coil embolized. No further bleeding seen at conclusion of the procedure.   ?   Please see dictation in PACS or under the Imaging tab in [email or web address] for detailed procedure note.     Marv Mcadams M.D.   Vascular and Interventional Radiology   Pager: [phone]   After Hours / Scheduling: [phone]     7/27/2025  1:40 PM

## 2025-07-27 NOTE — CODE/RAPID RESPONSE
Rapid response called for episode of bright red blood per rectum after patient was on the toilet with hypotension.  Per RN, patient went to the toilet for a bowel movement, and was noted to have bright red blood per rectum.  Patient began to feel lightheaded, vitals were obtained, and patient was noted to be hypotensive to 60 systolic.  Patient was placed in recliner chair and rapid response was called.     Patient admitted for rectal bleed, and was transferred to the ICU on the evening of 7/25/2025 given concern for active bleeding.  Patient underwent colonoscopy yesterday without any acute bleeding.  Bleeding thought to be diverticular in nature.    Upon my arrival, patient lying back in chair, pale appearing.  Stat hemoglobin, EKG, fluid bolus, unit of blood was ordered.  GI was paged.  Stat PICC order was placed for access.  Patient currently has 2 peripheral IVs, 1 18-gauge in left AC.    Patient's blood pressure had responded and improved to the 110s systolic.  Patient reported feeling better while laying reclined in the chair.  Given concern for ongoing bleeding, stat CTA was ordered to assess for any signs of active extravasation.  Given ongoing tachycardia, and softer blood pressures with signs of active ongoing rectal bleeding, additional unit of blood was ordered.     Given  active bleeding that is occurring, recommend patient receive higher level of care with ongoing ICU monitoring.  Discussed with intensivist, Dr. Talley,  and handoff was provided.        Kala Del Toro, DO  House Officer

## 2025-07-27 NOTE — PROGRESS NOTES
Patient Name: Juan Luis Mims  Medical Record Number: [id number]  Today's Date: 7/27/2025    Procedure: GI Bleed  Proceduralist: Dr Mcadams    Sedation medications administered: 1.5 mg midazolam and 50 mcg fentanyl   Sedation time: 40 minutes    Pt transported back to ICU room 359 post mesenteric angiogram awake and alert.  Report given to the receiving RN without questions or concerns.  Angiogram puncture site dry and soft without hematoma.

## 2025-07-27 NOTE — PROCEDURES
[facility]    Central line    Date/Time: 7/27/2025 11:08 AM    Performed by: Katiuska Talley MD  Authorized by: Katiuska Talley MD      UNIVERSAL PROTOCOL   Site Marked: No  Prior Images Obtained and Reviewed:  Yes  Required items: Required blood products, implants, devices and special equipment available    Patient identity confirmed:  Verbally with patient, arm band, provided demographic data and hospital-assigned identification number  NA - No sedation, light sedation, or local anesthesia  Confirmation Checklist:  Patient's identity using two indicators, relevant allergies, procedure was appropriate and matched the consent or emergent situation and correct equipment/implants were available  Time out: Immediately prior to the procedure a time out was called    Universal Protocol: the Joint Commission Universal Protocol was followed    Preparation: Patient was prepped and draped in usual sterile fashion          PROCEDURE    Length of time physician/provider present for 1:1 monitoring during sedation: 0      CENTRAL LINE INSERTION PROCEDURE NOTE  (NON-OR)    Procedure Date: 7/27/2025   Performing Physician: Katiuska Talley MD, MPH    Procedure:  Insertion of Central Venous Catheter (CORDIS/INTRODUCER CATHETER):  Right Internal Jugular Vein with Ultrasound Guidance.  Indications:  Juan Luis Mims is a 70 year old male with active lower GI bleed with CTA angiogram demonstrating active bleeding in the ascending colon.  Central line required for vasopressors, massive blood transfusion protocol.      Consent:  Procedure was done as an emergency : Yes  The risks, benefits, complications, treatment options, and expected outcomes were NOT discussed with the patient/family due to the emergent nature of the procedure.  The risks and potential complications of their problem and purposed procedure include but are not limited to infection, bleeding, pain,  lung puncture, the need for additional procedures,  creating a complication requiring transfusion or operation, and nerve and vessel injury.  The patient/alternate (see above) concurred with the proposed plan, giving informed consent.      Procedure Details:   A Time Out was performed. The patient was identified as Juan Luis Mims with Date of Birth 1955, and MRN [id number].  The procedure was verified as Insertion of Central Venous Catheter.  The site of the procedure was properly noted/marked.     Under sterile conditions the skin over the RIGHT INTERNAL  JUGULAR VEIN was prepped with Chlorhexidine and covered with a sterile drape.  Strict sterile conditions were maintained, and  work cap, mask, sterile gown, and sterile gloves for the procedure.  Local anesthetic with 5 ml of Lidocaine 1% was infiltrated into the skin and subcutaneous tissues.  Using ultrasound guidance in the sterile field, an 18-gauge needle was then inserted into the vein.  A guide wire was then passed easily through the needle.  The needle was removed.  The guide wire was visualized within the vein using ultrasound in both the vertical and longitudinal directions. There were no arrythmias.  An incision was made at the skin and the vessel was dilated with a 9.0 Tajik single lumen Cordis/Introducer overlying the dilator. The dilator and guidewire were removed simultaneously. Solitary port had non-pulsatile blood return and was flushed with sterile solution.  The catheter was sutured into place at 12 cm and an occlusive sterile dressing applied.      Number of attempts:  1    Estimated Blood Loss:  3 mL.    Complications:  NONE.  The patient tolerated the procedure well.     A post-procedure chest x-ray was ordered.      Condition: unstable    ________________________________________________________________________  Katiuska Talley MD, MPH  Pulmonary & Critical Care Medicine  07/27/2025 11:11 AM

## 2025-07-27 NOTE — CONSULTS
[facility]:  CRITICAL CARE CONSULT NOTE     Date / Time of Admission:  7/24/2025 11:05 PM    ID: Juan Luis Mims is a 70 year old male with history of diverticular disease, colonic polyp, recently diagnosed COPD, seasonal allergies, and glaucoma who presented to [facility] ED on 7/24/2025 due to rectal bleeding, admitted for evaluation of acute lower GI bleed.  Colonoscopy 7/26/2025 without stigmata of active/recent bleeding.  Subsequent decompensation 7/27 with presyncopal event, acute active lower GI bleed, necessitating ICU transfer for management of hemorrhagic shock and the setting of active bleeding from the ascending colon.         Changes for today:   Transferred to ICU status due to active lower bleeding and hemorrhagic shock.  Norepinephrine gtt initiated  RIJ Cordis catheter emergently placed  Massive transfusion protocol initiated.  Thus far has received today: 6 units pRBC, 2 units FFP, 1 unit platelets, 1 unit cryoprecipitate  Calcium IV 2 gm given  CTA abd/pelvis study showing active extravasation from ascending colon, reviewed with Radiologist  Interventional Radiologist consulted, plan for embolization. IR suite activated.  Remains NPO since 7/26.  Developing wheezing - unclear if this is COPD related or pulm edema, but given rapid transfusion will give lasix 20 mg IV x1 and also albuterol nebs PRN started.        Assessment/Plan:   Neurologic: Glaucoma.    Pain control: Tylenol, PRNs as needed.   Continue PTA latanoprost     Pulmonary: Recently diagnosed COPD, new-onset wheezing, history of seasonal allergies.  Patient developed wheezing 7/27 after initiation of massive transfusion protocol, concern for potentially pulmonary edema developing.  Not yet concerned for an acute COPD exacerbation but will continue to monitor.  Spirometry completed as outpatient 7/16, this showed FEV1 1.45 L (53.3 % predicted), FEV1/FVC 58.81, reported as moderately-severely obstructive  process, unable to perform bronchodilator challenge.  Wean supplemental O2 as tolerated; goal O2 sat > 92%.  HOB > 30 degrees to limit aspiration risk.    Continue Breo Ellipta (substitute for home Advair)  Albuterol nebs Q4H PRN wheezing/dyspnea  Lasix 20 mg IV x 1   Continue PTA allegra, flonase      Cardiovascular: Hemorrhagic shock.  Patient with active exsanguination related to lower GI bleed, found to have active extravasation at the ascending colon on CTA GI Bleed study 7/27.  No history of cardiac disease.  Cardiac monitoring.  SBP >= 90 mmHg, MAP >= 65 mmHg.  EKG PRN.  RIJ cordis emergently placed  Norepinephrine gtt as needed for BP support  Massive transfusion protocol as initiated below     GI/: Acute lower GI bleed with active extravasation at the ascending colon, large internal hemorrhoids, history of diverticular disease and colonic polyp. Underwent colonoscopy on 7/26 which showed an inadequate prep, blood in the entire examined colon was noted but no signs of recent bleeding or active bleeding.  Also found to have internal hemorrhoids during retroflexion; the hemorrhoids were large.   Appreciate GI and IR consultations.  Plan for patient to go to IR today for embolization.  Will monitor outcome following procedure.   Nutrition: NPO.   Last BM:  7/27.  Meds: None.  GI prophylaxis: PPI daily.     Renal: Mild renal insufficiency, hyperchloremia, metabolic acidosis (improved), hypoalbuminemia.  Mild renal insufficiency noted on lab studies 7/27 (Cr 1.08, baseline 0.85).   Monitor I/O's.  Electrolyte repletion PRN.  Avoid/limit nephrotoxic agents.  IVFs: Saline lock.  Calcium IV needed to be given intermittently given blood transfusions.      Heme/Onc: Hemorrhagic shock with GI bleed. Thrombocytopenia. Normocytic anemia. Coagulopathy.    Acute hemorrhage 7/27 with massive transfusion protocol initiated.   Thus far today, pt received: 6 units pRBC, 2 units FFP, 1 unit platelets, 1 unit  "cryoprecipitate  DVT prophylaxis: SCDs.     Endocrine: No issues.    FSBG Q4H, Aspart insulin SS (low dose).  Hypoglycemia protocol.     Infectious diseases: No issues.    General precautions    Rheum/Musculoskeletal:   Activity: Bedrest      RISK FACTORS PRESENT ON ADMISSION:  Clinically Significant Risk Factors          # Hyperchloremia: Highest Cl = 109 mmol/L in last 2 days, will monitor as appropriate          # Hypoalbuminemia: Lowest albumin = 2.7 g/dL at 7/27/2025  5:54 AM, will monitor as appropriate     # Thrombocytopenia: Lowest platelets = 128 in last 2 days, will monitor for bleeding                  # Overweight: Estimated body mass index is 29.08 kg/m  as calculated from the following:    Height as of this encounter: 1.702 m (5' 7\").    Weight as of this encounter: 84.2 kg (185 lb 11.2 oz).  , PRESENT ON ADMISSION       # Financial/Environmental Concerns: none                Lines/Drains/Tubes:  Right internal jugular Cordis/Introducer central line, placed 7/27  Mars catheter, placed 7/27     Code Status:  Full Code     The patient and/or the family was educated about the above plan of care and indicated understanding.  Called partner Veronica (she was at bedside earlier during initial events), updates provided. She will return tomorrow, patient also updated.     This patient is considered critically ill and requires ICU level of care due to hemorrhagic shock requiring massive transfusion protocol and vasopressors.     Total Critical Care time, not including separate billable procedure time: 70 minutes.    Katiuska Talley MD, MPH  Pulmonary/Critical Care Medicine  07/27/2025   11:41 AM         ICU DAILY CHECKLIST:   ICU DAILY CHECKLIST           Can patient transfer out of MICU? no    FAST HUG:    Feeding:  no.  Patient is receiving NPO    Mars: no  Analgesia/Sedation: no;    Thromboembolic prophylaxis: yes; Mode:  SCDs  HOB>30:  yes  Stress Ulcer Protocol Active: yes; Mode: PPI  Glycemic Control: " Any glucose > 180 no; Mode of Insulin Therapy: Sliding Scale Insulin    INTUBATED:  Can patient have daily waking:  n/a  Can patient have spontaneous breathing trial:  n/a    Restraints? no    PHYSICAL THERAPY AND MOBILITY:  Can patient have PT and mobility trial: no  Activity: Bedrest          History of Present Illness:   Juan Luis Mims is a 70 year old male with diverticular disease of the colon, hemorrhoids, history of colonic polyp, newly diagnosed COPD, seasonal allergies, and glaucoma who presented to [facility] ED on 7/24/2025 due to rectal bleeding, admitted for evaluation of acute lower GI bleed.  Colonoscopy 7/26/2025 without stigmata of active/recent bleeding.  Subsequent decompensation 7/27 with presyncopal event, acute active lower GI bleed, necessitating ICU transfer for management of hemorrhagic shock and the setting of active bleeding from the ascending colon.    In brief, patient had abdominal pain and hematochezia with 3 episodes of blood in his stool.  Abdominal pain with cramping.  In the ED, vitals stable. Abdomen with noted mild suprapubic tenderness, no guarding.  Labs with hemoglobin 12.3.  CTA GI bleed study showed no active gastric intestinal bleeding identified, mild colonic diverticulosis noted.  Patient was admitted for GI bleed evaluation.  On 7/25, had recurrent hematochezia, syncopal event and patient found on the floor.  Rapid response called, transferred to the ICU for continued monitoring.  Gastroenterology team consulted, underwent colonoscopy on 7/26 which showed an inadequate prep, blood in the entire examined colon was noted but no signs of recent bleeding or active bleeding.  Also found to have internal hemorrhoids during retroflexion; the hemorrhoids were large.     Today, patient was sitting in the chair when he developed presyncopal symptoms of lightheadedness, dizziness, and had recurrent bleeding from the rectum.  He subsequently developed transient hypotension  and tachycardia, given NS 1000 mL bolus.  Was sent for CTA abdomen study to again evaluate for GI bleed, while he was in the scanner continue to have active bleeding per rectum and developed recurrent hypotension.  Peripheral norepinephrine initiated.  Upon ICU return, massive transfusion protocol initiated, right IJ Cordis catheter placed emergently, and CTA resulted with active extravasation from the ascending colon. IR consulted.     Review of Systems:  The Review of Systems is negative other than noted in the HPI        Allergies/Medications:   Allergies:   No Known Allergies    Continuous Infusions:  Current Facility-Administered Medications   Medication Dose Route Frequency Provider Last Rate Last Admin    norepinephrine (LEVOPHED) 4 mg in  mL infusion PREMIX  0.01-0.6 mcg/kg/min Intravenous Continuous Katiuska Talley MD        sodium chloride 0.9 % infusion   Intravenous Continuous Monika De La Garza MD 75 mL/hr at 07/27/25 0600 Rate Verify at 07/27/25 0600     Scheduled Medications:  Current Facility-Administered Medications   Medication Dose Route Frequency Provider Last Rate Last Admin    calcium gluconate 2 g in  mL intermittent infusion  2 g Intravenous Once Lluvia Black MD        calcium gluconate 2 g/100 mL in  mL intermittent infusion             fexofenadine (ALLEGRA) tablet 180 mg  180 mg Oral Daily Oniel Larson MD   180 mg at 07/25/25 0855    fluticasone (FLONASE) 50 MCG/ACT spray 1 spray  1 spray Both Nostrils Daily Oniel Larson MD   1 spray at 07/26/25 1329    fluticasone-vilanterol (BREO ELLIPTA) 100-25 MCG/ACT inhaler 1 puff  1 puff Inhalation Daily Oniel Larson MD   1 puff at 07/26/25 1329    latanoprost (XALATAN) 0.005 % ophthalmic solution 1 drop  1 drop Right Eye At Bedtime Oniel Larson MD   1 drop at 07/26/25 2137    pantoprazole (PROTONIX) injection 40 mg  40 mg Intravenous Q24H Nazia Humphrey MD   40 mg at 07/26/25 2132    sodium chloride  0.9% BOLUS 1,000 mL  1,000 mL Intravenous Once Kala Del Toro DO               Medical History/Surgical History:     Past Medical History:   Diagnosis Date    COPD (chronic obstructive pulmonary disease) (H)     Kidney stone 06/13/2023    Sleep apnea      History reviewed. No pertinent surgical history.          Family History:   History reviewed. No pertinent family history.    Social History     Socioeconomic History    Marital status:      Spouse name: Not on file    Number of children: Not on file    Years of education: Not on file    Highest education level: Not on file   Occupational History    Not on file   Tobacco Use    Smoking status: Never    Smokeless tobacco: Never   Vaping Use    Vaping status: Not on file   Substance and Sexual Activity    Alcohol use: Not Currently    Drug use: Not on file    Sexual activity: Not on file   Other Topics Concern    Not on file   Social History Narrative    Not on file     Social Drivers of Health     Financial Resource Strain: Low Risk  (7/25/2025)    Financial Resource Strain     Within the past 12 months, have you or your family members you live with been unable to get utilities (heat, electricity) when it was really needed?: No   Food Insecurity: Low Risk  (7/25/2025)    Food Insecurity     Within the past 12 months, did you worry that your food would run out before you got money to buy more?: No     Within the past 12 months, did the food you bought just not last and you didn t have money to get more?: No   Transportation Needs: Low Risk  (7/25/2025)    Transportation Needs     Within the past 12 months, has lack of transportation kept you from medical appointments, getting your medicines, non-medical meetings or appointments, work, or from getting things that you need?: No   Physical Activity: Not on file   Stress: Not on file   Social Connections: Not on file   Interpersonal Safety: Low Risk  (7/26/2025)    Interpersonal Safety     Do you feel physically  "and emotionally safe where you currently live?: Yes     Within the past 12 months, have you been hit, slapped, kicked or otherwise physically hurt by someone?: No     Within the past 12 months, have you been humiliated or emotionally abused in other ways by your partner or ex-partner?: No   Housing Stability: Low Risk  (7/25/2025)    Housing Stability     Do you have housing? : Yes     Are you worried about losing your housing?: No           Objective:   Vitals:  /61   Pulse 106   Temp 99  F (37.2  C)   Resp 19   Ht 1.702 m (5' 7\")   Wt 84.2 kg (185 lb 11.2 oz)   SpO2 98%   BMI 29.08 kg/m    Vent: Resp: 19  GEN: Awake, fatigued, slightly anxious  HEENT: Normocephalic, atraumatic.  Extraoccular eye movements intact, anicteric sclera. No sinus tenderness to palpation.  Moist mucous membranes. Pale lips.  NECK: Supple.  Right internal jugular cordis.  PULM: Non-labored breathing.  No use of accessory muscles.  Anterior left sided wheezing (faint).  CVS: Tachycardic, regular rhythm.  Normal S1, S2.  No rubs, murmurs, or gallops.    ABDOMEN: Hypoactive bowel sounds.  Mildly tender to lower abdomen. Non-distended.    EXTREMITES:  No clubbing, cyanosis, or edema.    NEURO:  Awake.  Oriented to person, place, time and situation.  Cranial nerves 2-12 grossly intact.  Moving all extremities.      Intake/Output: I/O last 3 completed shifts:  In: 2313.75 [I.V.:1388.75]  Out: 700 [Urine:700]        Pertinent Studies:   All laboratory data reviewed  Serum Glucose range:   Recent Labs   Lab 07/27/25  0554 07/26/25  1140 07/26/25  0847 07/25/25 2014   * 127* 124* 147*     ABG: No lab results found in last 7 days.  CBC:   Recent Labs   Lab 07/27/25  0944 07/27/25  0554 07/26/25  2218 07/25/25  2152 07/25/25  1824 07/25/25  0148 07/24/25  2322   WBC  --  8.3  --   --  7.7  --  7.8   HGB 8.3* 8.0*  8.0* 7.6*   < > 8.9*  8.9*   < > 12.3*   HCT  --  24.3*  --   --  26.5*  --  37.4*   MCV 89 89  89 88   < > 90  " "90   < > 88   PLT  --  128*  --   --  143*  --  258   NEUTROPHIL  --   --   --   --   --   --  49   LYMPH  --   --   --   --   --   --  37   MONOCYTE  --   --   --   --   --   --  8   EOSINOPHIL  --   --   --   --   --   --  5    < > = values in this interval not displayed.     Chemistry:   Recent Labs   Lab 07/27/25  0554 07/25/25  1824 07/24/25  2322    137 138   POTASSIUM 3.8 4.3 3.9   CHLORIDE 109* 107 108*   CO2 28 19* 20*   BUN 9.5 11.9 18.7   CR 1.08 0.85 0.96   GFRESTIMATED 75 >90 86   SUZE 7.5* 7.8* 8.4*   PROTTOTAL 4.2*  --   --    ALBUMIN 2.7*  --   --    AST 22  --   --    ALT 16  --   --    ALKPHOS 43  --   --    BILITOTAL 0.6  --   --      Coags:  No results for input(s): \"INR\", \"PROTIME\", \"PTT\" in the last 168 hours.    Invalid input(s): \"APTT\"  Cardiac Markers:  No results for input(s): \"CKTOTAL\", \"TROPONINI\" in the last 168 hours.     Microbiology:  None        Cardiology/Radiology:   Cardiac: All cardiac studies reviewed by me.  EKG:  Reviewed  TTE:  Summary: None    Radiology:  All imaging studies reviewed by me.  Chest X-Ray, 7/27:  Right IJ sheath likely at the junction of the IJ and innominate veins. No pneumothorax. The lungs are clear.   CTA GI Bleed study, 7/27:  FINDINGS: ANGIOGRAM ABDOMEN/PELVIS: Normal caliber abdominal aorta. No dissection. Mild calcified atherosclerotic plaque. Origins of the celiac axis, SMA, single bilateral renal arteries, and MILA are widely patent. Normal caliber iliac and visualized femoral arteries without significant stenosis. There is active arterial extravasation in the ascending colon (series 6, image 164). LOWER CHEST: Minimal bilateral pleural fluid and mild bibasilar atelectasis/scarring. HEPATOBILIARY: Cholelithiasis. PANCREAS: Normal. SPLEEN: Normal. ADRENAL GLANDS: Normal. KIDNEYS/BLADDER: No hydronephrosis. Nonobstructing right renal calculi. Few renal hypodensities are too small to characterize, for which no dedicated imaging follow-up is " required. BOWEL: No bowel obstruction. Few colonic diverticula without acute diverticulitis. Normal appendix. No ascites or pneumoperitoneum. LYMPH NODES: Normal. PELVIC ORGANS: Normal.   MUSCULOSKELETAL: Thoracolumbar spondylosis. No destructive osseous lesion. IMPRESSION: 1.  Active arterial extravasation within the ascending colon. 2.  Cholelithiasis. 3.  Nonobstructing right renal calculi.

## 2025-07-27 NOTE — PROGRESS NOTES
RT's responded to rapid response. Patient was up to chair on RA with SpO2 at 96%. RT's were dismissed.     Germain Collins, RT

## 2025-07-27 NOTE — PROGRESS NOTES
[facility] Progress Note    Subjective:  This morning patient was comfortably laying in bed.  In discussion with nursing staff and the patient he had 1 maroon bowel movement last night around midnight.  Otherwise did well.  Did receive 2 blood transfusions today.    Contacted about 30-60 minutes ago that had some ongoing hematochezia well as drop in blood pressure.  Communicated to charge nurse about performing stat CTA and if positive contacting interventional radiology immediately.  Charge nurse communicated they would communicate this to the resident team.  Objective:  Vital signs in last 24 hours  Temp:  [98.2  F (36.8  C)-99.3  F (37.4  C)] 99  F (37.2  C)  Pulse:  [] 106  Resp:  [12-28] 19  BP: ()/(46-61) 114/61  SpO2:  [90 %-99 %] 98 % O2 Device: None (Room air)      Constitutional: healthy, alert, and no distress   Cardiovascular: Normal rate  Respiratory: Normal respiratory rate  Abdomen: no pain upon abdominal palpation  NEURO: moves all extremities spontaneously   JOINT/EXTREMITIES: extremities normal- no gross deformities noted and normal muscle tone      LABORATORY    ELECTROLYTE PANEL   Recent Labs   Lab 07/27/25  0554 07/26/25  1140 07/26/25  0847 07/25/25 2014 07/25/25  1824 07/25/25  1809 07/24/25  2322     --   --   --  137  --  138   POTASSIUM 3.8  --   --   --  4.3  --  3.9   CHLORIDE 109*  --   --   --  107  --  108*   CO2 28  --   --   --  19*  --  20*   * 127* 124*   < > 164*   < > 162*   CR 1.08  --   --   --  0.85  --  0.96   BUN 9.5  --   --   --  11.9  --  18.7    < > = values in this interval not displayed.      HEMATOLOGY PANEL   Recent Labs   Lab 07/27/25  0944 07/27/25  0554 07/26/25 2218 07/25/25 2152 07/25/25  1824 07/25/25  0148 07/24/25  2322   HGB 8.3* 8.0*  8.0* 7.6*   < > 8.9*  8.9*   < > 12.3*   MCV 89 89  89 88   < > 90  90   < > 88   WBC  --  8.3  --   --  7.7  --  7.8   PLT  --  128*  --   --  143*  --  258    < > = values in this  interval not displayed.      LIVER AND PANCREAS PANEL   Recent Labs   Lab 07/27/25  0554   AST 22   ALT 16   ALKPHOS 43   BILITOTAL 0.6     IMAGING STUDIES    CT Head w/o Contrast  Result Date: 7/25/2025  EXAM: CT HEAD W/O CONTRAST LOCATION: [facility] DATE: 07/25/2025 INDICATION: Fall, questionable head impact. COMPARISON: Brain MRI 07/11/2010. TECHNIQUE: Routine CT Head without IV contrast. Multiplanar reformats. Dose reduction techniques were used. FINDINGS: INTRACRANIAL CONTENTS: Bilateral basal ganglia calcifications. No intracranial hemorrhage, extra-axial collection, or mass effect. No CT evidence of acute infarct. Normal parenchymal attenuation. Normal ventricles and sulci. VISUALIZED ORBITS/SINUSES/MASTOIDS: No intraorbital abnormality. No paranasal sinus mucosal disease. No middle ear or mastoid effusion. BONES/SOFT TISSUES: No acute abnormality.     IMPRESSION: 1.  Normal head CT.    CT Cervical Spine w/o Contrast  Result Date: 7/25/2025  EXAM: CT CERVICAL SPINE W/O CONTRAST LOCATION: [facility] DATE: 7/25/2025 INDICATION: Fall, question head impact. COMPARISON: None. TECHNIQUE: Routine CT Cervical Spine without IV contrast. Multiplanar reformats. Dose reduction techniques were used.     IMPRESSION: There is normal alignment of the cervical vertebrae; however, there is reversal of normal cervical lordosis centered at the C3 level. Vertebral body heights of the cervical spine are normal. Craniocervical alignment is normal. No fractures. There is loss of disc space height and degenerative endplate spurring at C5-C6. Minimal facet arthropathy throughout the cervical spine. Minimal posterior disc bulging at C2-C3, C3-C4 and C4-C5. No spinal canal stenosis. No prevertebral soft tissue swelling.    CTA GI Bleed  Result Date: 7/25/2025  EXAM: CTA GI BLEED LOCATION: [facility] DATE: 7/25/2025 INDICATION: Hematochezia ongoing with syncopal  event COMPARISON: CT angiogram 7/24/2025 TECHNIQUE: CT angiogram abdomen pelvis during arterial phase of injection of IV contrast. 2D and 3D MIP reconstructions were performed by the CT technologist. Dose reduction techniques were used. CONTRAST: ISOVUE 370 75ML FINDINGS: ANGIOGRAM ABDOMEN/PELVIS: There is no evidence of abdominal aortic aneurysm or dissection. Mild calcified and noncalcified atherosclerosis. The celiac, superior mesenteric, bilateral renal and inferior mesenteric arteries widely patent. No evidence of hemodynamically significant stenosis in the pelvis. No evidence of active peritoneal or retroperitoneal hemorrhage. Specifically, no evidence of active, intraluminal gastrointestinal hemorrhage. LOWER CHEST: Unremarkable. HEPATOBILIARY: Cholelithiasis without evidence of acute cholecystitis or biliary obstruction. PANCREAS: Normal. SPLEEN: Multiple calcified splenic granulomas. ADRENAL GLANDS: Normal. KIDNEYS/BLADDER: Multiple nonobstructing right renal calculi. No ureterolithiasis or hydronephrosis bilaterally. Urinary bladder is unremarkable. BOWEL: Fluid is noted throughout the length of the colon. Scattered diverticuli without evidence of acute diverticulitis. No definite evidence of active, intraluminal gastrointestinal hemorrhage. Of note, there are several hyperdense foci in the descending and sigmoid colon which are unchanged between pre and postcontrast images. No evidence of mechanical bowel obstruction or acute inflammation. LYMPH NODES: No suspicious lymphadenopathy. No abdominopelvic free fluid. PELVIC ORGANS: Mild prostatomegaly. MUSCULOSKELETAL: Minimally displaced, acute-appearing left lateral eighth rib fracture (series 10 image 79). No additional acute bony abnormality.     IMPRESSION: 1.  Fluid throughout the length of the colon, suggestive of diarrheal illness. No definite active, intraluminal gastrointestinal hemorrhage. 2.  Minimally displaced, acute-appearing left lateral eighth  rib fracture.    CTA GI Bleed  Result Date: 7/25/2025  EXAM: CTA GI BLEED LOCATION: [facility] DATE: 7/25/2025 INDICATION: rectal bleeding today COMPARISON: CT abdomen/pelvis 6/13/2023 TECHNIQUE: CT angiogram abdomen pelvis during arterial phase of injection of IV contrast. 2D and 3D MIP reconstructions were performed by the CT technologist. Dose reduction techniques were used. CONTRAST: 90ml isovue 370 FINDINGS: ANGIOGRAM ABDOMEN/PELVIS: No intraluminal extravasation of contrast in small bowel or large bowel. Abdominal Aorta: Patent and normal in caliber. Mild aortic calcifications. No evidence of dissection or penetrating ulcer. Celiac Artery: Patent. Superior Mesenteric Artery: Patent. Right Renal Artery: Patent. Left Renal Artery: Patent. Inferior Mesenteric Artery: Patent. Right Common Iliac Artery: Patent. Right External Iliac Artery: Patent. Right Internal Iliac Artery: Patent. Right Common Femoral Artery: Patent. Proximal Right Superficial Femoral Artery: Patent. Proximal Right Deep Femoral Artery: Patent. Left Common Iliac Artery: Patent. Left External Iliac Artery: Patent. Left Internal Iliac Artery: Patent. Left Common Femoral Artery: Patent. Proximal Left Superficial Femoral Artery: Patent. Proximal Left Deep Femoral Artery: Patent. LOWER CHEST: Unremarkable. HEPATOBILIARY: Liver appears normal. Multiple tiny gallstones in the gallbladder. No significant gallbladder distention. No biliary dilatation. PANCREAS: Normal. SPLEEN: Punctate calcifications in the spleen, compatible with old granulomatous disease. ADRENAL GLANDS: Normal. KIDNEYS/BLADDER: Few nonobstructing right renal calculi measuring up to 0.3 cm. No hydronephrosis. Kidneys appear normal. Urinary bladder is mostly decompressed. BOWEL: No intraluminal extravasation of contrast in small bowel or large bowel. Mild colonic diverticulosis. No evidence of acute diverticulitis. Small bowel appears normal. No bowel  obstruction. Normal appendix. No evidence of acute appendicitis. LYMPH NODES: No lymphadenopathy. PELVIC ORGANS: Unremarkable. MUSCULOSKELETAL: Fat-containing left inguinal hernia. Multilevel degenerative changes of the spine.     IMPRESSION: 1.  No active gastrointestinal bleeding identified. 2.  Mild colonic diverticulosis. No evidence of acute diverticulitis.      I have reviewed the current diagnostic and laboratory tests.                Assessment:  He is a 70-year-old male with a history of COPD, seasonal allergies, hyperlipidemia, and recent back pain for which he was taking ibuprofen and naproxen. He presents with several episodes of rectal bleeding and cramping. Initial CT angio was negative for active bleeding or obvious inflammation. Hemoglobin has dropped slightly.     #hematochezia, likely diverticular bleed    Colonoscopy was performed yesterday.  Upon careful inspection blood was seen in the entire colon but no actual cause of bleeding/recent bleeding was encountered.  Likely diverticular in nature.  TI showed some mild blood-tinged liquid but likely reflux.  No intervention was performed.    I was hoping that likely diverticular bleed had stopped.  Contacted about 1 hour ago about him having hematochezia and change in vitals.  Recommended immediate CTA and if positive contact interventional radiology provide to perform procedure today.  We  are waiting for the CTA.    Patient had massive transfusion protocol initiated.  If nothing is found above we can consider reprepping consideration EGD and colon Tomorrow morning though the yield will overall be low.    Plan:  --CTA stat, if positive immediate stat consult to interventional radiology for treatment of likely diverticular bleed  -- We will continue to follow  --Nothing more than clear liquid diet for now    35 minutes of total time was spent providing patient care including patient evaluation, reviewing documentation/test results, and order  entry.                                                  Philip Luna MD  Thank you for the opportunity to participate in the care of this patient.   Please feel free to call me with any questions or concerns.  Phone number [phone].      Patient Active Problem List   Diagnosis    Bright red rectal bleeding    GI bleed    Diverticular hemorrhage

## 2025-07-27 NOTE — PROGRESS NOTES
[facility] Progress Note - Hospitalist Service    Date of Admission:  7/24/2025    Assessment & Plan   Juan Luis Mims is a 70 year old male admitted on 7/24/2025. He came to the ED for evaluation of rectal bleed    #Acute lower GI bleed, likely diverticular bleed  Mesenteric CT on admission and repeated on 7/25 without active bleeding.  Colonoscopy 7/26 showed extensive diverticulosis of transverse, descending and sigmoid colon's, without active bleeding, although colon prep was not optimal.  No history of potential lower GI bleed.  D/W patient clinical presentation dynamics of diverticular bleed, and with recurrences may need surgical treatment.  - Additional GI bleeding morning of 7/27 requiring massive transfusion, underwent mesenteric angiogram with embolization of the hepatic flexure branches 7/27  -- Transition to ICU for ongoing care as they are needing pressors for hemorrhagic shock    #Acute blood loss anemia baseline hemoglobin 12, upon visitation was 10.6.  It dropped down to 7.3, patient was symptomatic, with syncopal episode.  - S/p total 3 units PRBCs 7/26, massive transfusion 7/27  - Continue to closely monitor hemoglobins  - Protonix per GI    #Syncope and collapse on 7/25, likely due to orthostatic hypotension/from GI bleed.  #Left eighth rib fracture, from the fall.  - No other traumatic injuries.  - Pain management  - I-S    #COPD without exacerbation  - Recent diagnosis, lifetime non-smoker  - Continue PTA Advair, fluticasone nasal spray, fexofenadine  - DuoNebs as needed        Diet: NPO for Medical/Clinical Reasons Except for: No Exceptions    DVT Prophylaxis: Pneumatic Compression Devices  Mars Catheter: PRESENT, indication:    Lines: PRESENT             Cardiac Monitoring: ACTIVE order. Indication: ICU  Code Status: Full Code      Clinically Significant Risk Factors          # Hyperchloremia: Highest Cl = 109 mmol/L in last 2 days, will monitor as  "appropriate      # Hypocalcemia: Lowest iCa = 4.1 mg/dL in last 2 days, will monitor and replace as appropriate     # Hypoalbuminemia: Lowest albumin = 2.7 g/dL at 7/27/2025  5:54 AM, will monitor as appropriate  # Coagulation Defect: INR = 1.24 (Ref range: 0.85 - 1.15) and/or PTT = 31 Seconds (Ref range: 22 - 38 Seconds), will monitor for bleeding  # Thrombocytopenia: Lowest platelets = 98 in last 2 days, will monitor for bleeding              # Overweight: Estimated body mass index is 29.08 kg/m  as calculated from the following:    Height as of this encounter: 1.702 m (5' 7\").    Weight as of this encounter: 84.2 kg (185 lb 11.2 oz)., PRESENT ON ADMISSION     # Financial/Environmental Concerns: none         Social Drivers of Health            Disposition Plan     Medically Ready for Discharge: Anticipated in 5+ Days             Brenna Shields MD  Hospitalist Service  [facility]  Securely message with [employer] (more info)  Text page via [employer] Paging/Directory   ______________________________________________________________________    Interval History   Developed bleeding sometime this morning with severe blood loss.  Underwent PICC line placement and massive transfusion.  CTA showed bleeding at the hepatic flexure.  Taken by IR for mesenteric angiogram with embolization of the hepatic flexure branches by IR.  Requiring pressors and now under ICU care.  Will continue to follow along.    Physical Exam   Vital Signs: Temp: 98.2  F (36.8  C) Temp src: Oral BP: (!) 141/79 Pulse: (!) 130   Resp: 20 SpO2: 94 % O2 Device: Oxymask Oxygen Delivery: 5 LPM  Weight: 185 lbs 11.2 oz      Medical Decision Making       50 MINUTES SPENT BY ME on the date of service doing chart review, history, exam, documentation & further activities per the note.      Data     I have personally reviewed the following data over the past 24 hrs:    7.6  \   9.7 (L); 9.7 (L)   / 98 (L)     139 108 (H) 9.5 /  124 (H)   3.5; " 3.5 24 1.08 \     ALT: 16 AST: 22 AP: 43 TBILI: 0.6   ALB: 2.7 (L) TOT PROTEIN: 4.2 (L) LIPASE: N/A     INR:  1.24 (H) PTT:  31   D-dimer:  N/A Fibrinogen:  208       Imaging results reviewed over the past 24 hrs:   Recent Results (from the past 24 hours)   CTA GI Bleed   Result Value    Radiologist flags (AA)     Active extravasation from a vessel or major vascular injury    Narrative    EXAM: CTA GI BLEED  LOCATION: [facility]  DATE: 7/27/2025    INDICATION: brpbp w hypotension  COMPARISON: CTA abdomen/pelvis 7/25/2025.  TECHNIQUE: CT angiogram abdomen pelvis during arterial phase of injection of IV contrast. 2D and 3D MIP reconstructions were performed by the CT technologist. Dose reduction techniques were used.  CONTRAST: 90 ml iso 370    FINDINGS:  ANGIOGRAM ABDOMEN/PELVIS: Normal caliber abdominal aorta. No dissection. Mild calcified atherosclerotic plaque. Origins of the celiac axis, SMA, single bilateral renal arteries, and MILA are widely patent. Normal caliber iliac and visualized femoral   arteries without significant stenosis.    There is active arterial extravasation in the ascending colon (series 6, image 164).    LOWER CHEST: Minimal bilateral pleural fluid and mild bibasilar atelectasis/scarring.    HEPATOBILIARY: Cholelithiasis.    PANCREAS: Normal.    SPLEEN: Normal.    ADRENAL GLANDS: Normal.    KIDNEYS/BLADDER: No hydronephrosis. Nonobstructing right renal calculi. Few renal hypodensities are too small to characterize, for which no dedicated imaging follow-up is required.    BOWEL: No bowel obstruction. Few colonic diverticula without acute diverticulitis. Normal appendix. No ascites or pneumoperitoneum.    LYMPH NODES: Normal.    PELVIC ORGANS: Normal.    MUSCULOSKELETAL: Thoracolumbar spondylosis. No destructive osseous lesion.      Impression    IMPRESSION:  1.  Active arterial extravasation within the ascending colon.  2.  Cholelithiasis.  3.  Nonobstructing right  renal calculi.    [Critical Result: Active extravasation from a vessel or major vascular injury]    Finding was identified on 7/27/2025 10:54 AM CDT.     Dr. Talley was contacted by me on 7/27/2025 11:11 AM CDT and verbalized understanding of the critical result.    XR Chest Port 1 View    Narrative    EXAM: XR CHEST PORT 1 VIEW  LOCATION: [facility]  DATE: 7/27/2025    INDICATION: line placement verification  COMPARISON: 9/12/2014      Impression    IMPRESSION: Right IJ sheath likely at the junction of the IJ and innominate veins. No pneumothorax. The lungs are clear.   IR Visceral Angiogram    Narrative    [facility]  LOCATION: [facility]  DATE: 7/27/2025    PROCEDURE:   ULTRASOUND-GUIDED ACCESS INTO THE RIGHT COMMON FEMORAL ARTERY  SUPERIOR MESENTERIC ARTERY CATHETERIZATION AND VENOGRAM   ASCENDING COLIC ARTERY, THIRD ORDER BRANCH OF THE SUPERIOR MESENTERIC ARTERY, CATHETERIZATION, ANGIOGRAPHY, AND COIL EMBOLIZATION  HEPATIC FLEXURE COLIC ARTERY, THIRD ORDER BRANCH OF THE SUPERIOR MESENTERIC ARTERY, CATHETERIZATION, ANGIOGRAPHY, AND COIL EMBOLIZATION  IPSILATERAL RIGHT EXTERNAL ILIAC ARTERY ANGIOGRAM  MODERATE SEDATION  USAGE OF A VASCULAR CLOSURE DEVICE    INTERVENTIONAL RADIOLOGIST: Marv Mcadams MD    INDICATION: The patient is a 70-year-old male with a history of active GI bleeding with extravasation seen on CTA in the hepatic flexure who presents to interventional radiology for mesenteric angiography and possible embolization as needed.    CONSENT: The risks, benefits and alternatives of mesenteric angiogram and possible intervention were discussed with the patient  in detail. All questions were answered. Informed consent was given to proceed with the procedure.    MODERATE SEDATION: Versed 1.5 mg IV; Fentanyl 50 mcg IV. During the time out, immediately prior to the administration of medications, the patient was reassessed for adequacy to receive  conscious sedation.  Under physician supervision, Versed and fentanyl   were administered for moderate sedation. Pulse oximetry, heart rate and blood pressure were continuously monitored by an independent trained observer. The physician spent 40 minutes of face-to-face sedation time with the patient.    CONTRAST: 40 cc of Omnipaque 350  ANTIBIOTICS: None.  ADDITIONAL MEDICATIONS: None.    FLUOROSCOPIC TIME: 7.6 minutes.  RADIATION DOSE: Air Kerma: 2652 mGy.    COMPLICATIONS: No immediate complications.    STERILE BARRIER TECHNIQUE: Maximum sterile barrier technique was used. Cutaneous antisepsis was performed at the operative site with application of 2% chlorhexidine and large sterile drape. Prior to the procedure, the  and assistant performed   hand hygiene and wore hat, mask, sterile gown, and sterile gloves during the entire procedure.    PROCEDURE:  Informed consent was obtained. The patient was placed supine on the fluoroscopy table. Patient was sterilely prepped and draped in normal fashion. Under ultrasound guidance, the right  common femoral artery was identified and appeared patent. An image of   this was saved. A 21-gauge micropuncture needle, again under ultrasound guidance, was advanced into the right  common femoral artery. A 0.018 wire was placed through the needle into the vessel. The needle was exchanged for 3/5 Marshallese coaxial dilator.   The wire and inner 3 Marshallese dilator removed. A 0.035 inch Bentson  wire was then placed through the 5 Marshallese coaxial dilator. The dilator was exchanged over the wire for a 5 Marshallese vascular sheath.    Over a 5 Marshallese Cobra catheter, the superior mesenteric artery was catheterized and angiography was performed.    Through the base catheter, a 2.4 Marshallese microcatheter was utilized to select a third order branch of the superior mesenteric artery supplying the ascending colon artery and an angiogram was performed. The microcatheter was further advanced into the    bleeding mucosal branch of the vessel where embolization was performed utilizing a single 2 mm x 3 mm Daniel coil.    The 2.4 Rw[place]an microcatheter was then utilized to select a third order branch of the superior mesenteric artery supplying the hepatic flexure and an angiogram was performed. The microcatheter was further advanced into the bleeding mucosal branch of the   vessel where embolization was performed utilizing a single 2 mm x 3 mm Daniel coil.    Repeat angiography was performed. Base catheter and microcatheter both removed.    A right external iliac artery angiogram was performed through the side arm of the vascular sheath. The arteriotomy was then closed utilizing a 6 Rw[place]an VIP Angio-Seal closure device. Hemostasis was achieved. A clean and sterile dressing was applied. The   patient tolerated the procedure well.    FINDINGS:  Ultrasound demonstrated a widely patent right common femoral artery and appropriate intraluminal needle placement. A permanent image was saved.    Superior mesenteric artery angiography demonstrates expected branching pattern with active bleeding seen in the region of the hepatic flexure correlates with CT findings.    Ascending colic artery angiography confirms active bleeding from a lateral branch. Subselected angiography from the bleeding branch confirms active extravasation. After coil embolization as described above, no further bleeding was seen.    Hepatic flexure colic artery angiography confirms active bleeding from a lateral branch. Subselected angiography from the bleeding branch confirms active extravasation. After coil embolization as described above, no further bleeding was seen.    Ipsilateral right external iliac artery angiogram demonstrates appropriate positioning of the arteriotomy for usage of a vascular closure device. A permanent image was saved.      Impression    IMPRESSION:    1.  Active bleeding from 2 separate mucosal arteries from the ascending colic  artery/hepatic flexure which were successfully treated with coil embolization.

## 2025-07-28 LAB
ALBUMIN SERPL BCG-MCNC: 2.9 G/DL (ref 3.5–5.2)
ALP SERPL-CCNC: 46 U/L (ref 40–150)
ALT SERPL W P-5'-P-CCNC: 14 U/L (ref 0–70)
ANION GAP SERPL CALCULATED.3IONS-SCNC: 9 MMOL/L (ref 7–15)
AST SERPL W P-5'-P-CCNC: 20 U/L (ref 0–45)
BILIRUB SERPL-MCNC: 1 MG/DL
BUN SERPL-MCNC: 11.1 MG/DL (ref 8–23)
CA-I BLD-MCNC: 4.5 MG/DL (ref 4.4–5.2)
CALCIUM SERPL-MCNC: 7.7 MG/DL (ref 8.8–10.4)
CHLORIDE SERPL-SCNC: 107 MMOL/L (ref 98–107)
CREAT SERPL-MCNC: 0.93 MG/DL (ref 0.67–1.17)
EGFRCR SERPLBLD CKD-EPI 2021: 89 ML/MIN/1.73M2
ERYTHROCYTE [DISTWIDTH] IN BLOOD BY AUTOMATED COUNT: 15.5 % (ref 10–15)
GLUCOSE BLDC GLUCOMTR-MCNC: 113 MG/DL (ref 70–99)
GLUCOSE BLDC GLUCOMTR-MCNC: 118 MG/DL (ref 70–99)
GLUCOSE BLDC GLUCOMTR-MCNC: 122 MG/DL (ref 70–99)
GLUCOSE BLDC GLUCOMTR-MCNC: 124 MG/DL (ref 70–99)
GLUCOSE BLDC GLUCOMTR-MCNC: 127 MG/DL (ref 70–99)
GLUCOSE SERPL-MCNC: 117 MG/DL (ref 70–99)
HCO3 SERPL-SCNC: 27 MMOL/L (ref 22–29)
HCT VFR BLD AUTO: 24.9 % (ref 40–53)
HGB BLD-MCNC: 8.8 G/DL (ref 13.3–17.7)
HGB BLD-MCNC: 8.8 G/DL (ref 13.3–17.7)
HGB BLD-MCNC: 8.9 G/DL (ref 13.3–17.7)
HGB BLD-MCNC: 9.1 G/DL (ref 13.3–17.7)
INR PPP: 1.11 (ref 0.85–1.15)
MAGNESIUM SERPL-MCNC: 1.9 MG/DL (ref 1.7–2.3)
MCH RBC QN AUTO: 30.2 PG (ref 26.5–33)
MCHC RBC AUTO-ENTMCNC: 35.3 G/DL (ref 31.5–36.5)
MCV RBC AUTO: 86 FL (ref 78–100)
MCV RBC AUTO: 87 FL (ref 78–100)
PHOSPHATE SERPL-MCNC: 2.6 MG/DL (ref 2.5–4.5)
PLATELET # BLD AUTO: 98 10E3/UL (ref 150–450)
POTASSIUM SERPL-SCNC: 3.8 MMOL/L (ref 3.4–5.3)
PROT SERPL-MCNC: 4.4 G/DL (ref 6.4–8.3)
PROTHROMBIN TIME: 14.5 SECONDS (ref 11.8–14.8)
RBC # BLD AUTO: 2.91 10E6/UL (ref 4.4–5.9)
SODIUM SERPL-SCNC: 143 MMOL/L (ref 135–145)
WBC # BLD AUTO: 8.4 10E3/UL (ref 4–11)

## 2025-07-28 PROCEDURE — 3E043XZ INTRODUCTION OF VASOPRESSOR INTO CENTRAL VEIN, PERCUTANEOUS APPROACH: ICD-10-PCS | Performed by: INTERNAL MEDICINE

## 2025-07-28 PROCEDURE — 99233 SBSQ HOSP IP/OBS HIGH 50: CPT | Performed by: INTERNAL MEDICINE

## 2025-07-28 PROCEDURE — 85018 HEMOGLOBIN: CPT | Performed by: INTERNAL MEDICINE

## 2025-07-28 PROCEDURE — 84100 ASSAY OF PHOSPHORUS: CPT | Performed by: INTERNAL MEDICINE

## 2025-07-28 PROCEDURE — 82330 ASSAY OF CALCIUM: CPT | Performed by: INTERNAL MEDICINE

## 2025-07-28 PROCEDURE — 250N000013 HC RX MED GY IP 250 OP 250 PS 637: Performed by: INTERNAL MEDICINE

## 2025-07-28 PROCEDURE — 3E033XZ INTRODUCTION OF VASOPRESSOR INTO PERIPHERAL VEIN, PERCUTANEOUS APPROACH: ICD-10-PCS | Performed by: INTERNAL MEDICINE

## 2025-07-28 PROCEDURE — 120N000001 HC R&B MED SURG/OB

## 2025-07-28 PROCEDURE — 250N000011 HC RX IP 250 OP 636: Performed by: INTERNAL MEDICINE

## 2025-07-28 PROCEDURE — 85610 PROTHROMBIN TIME: CPT | Performed by: INTERNAL MEDICINE

## 2025-07-28 PROCEDURE — 80053 COMPREHEN METABOLIC PANEL: CPT | Performed by: INTERNAL MEDICINE

## 2025-07-28 PROCEDURE — 83735 ASSAY OF MAGNESIUM: CPT | Performed by: INTERNAL MEDICINE

## 2025-07-28 PROCEDURE — 36415 COLL VENOUS BLD VENIPUNCTURE: CPT | Performed by: INTERNAL MEDICINE

## 2025-07-28 RX ORDER — DEXTROSE MONOHYDRATE 25 G/50ML
25-50 INJECTION, SOLUTION INTRAVENOUS
Status: DISCONTINUED | OUTPATIENT
Start: 2025-07-28 | End: 2025-07-31 | Stop reason: HOSPADM

## 2025-07-28 RX ORDER — MAGNESIUM SULFATE HEPTAHYDRATE 40 MG/ML
2 INJECTION, SOLUTION INTRAVENOUS ONCE
Status: COMPLETED | OUTPATIENT
Start: 2025-07-28 | End: 2025-07-28

## 2025-07-28 RX ORDER — NICOTINE POLACRILEX 4 MG
15-30 LOZENGE BUCCAL
Status: DISCONTINUED | OUTPATIENT
Start: 2025-07-28 | End: 2025-07-31 | Stop reason: HOSPADM

## 2025-07-28 RX ORDER — POTASSIUM CHLORIDE 29.8 MG/ML
20 INJECTION INTRAVENOUS ONCE
Status: COMPLETED | OUTPATIENT
Start: 2025-07-28 | End: 2025-07-28

## 2025-07-28 RX ORDER — PANTOPRAZOLE SODIUM 40 MG/1
40 TABLET, DELAYED RELEASE ORAL
Status: DISCONTINUED | OUTPATIENT
Start: 2025-07-29 | End: 2025-07-31 | Stop reason: HOSPADM

## 2025-07-28 RX ADMIN — MAGNESIUM SULFATE HEPTAHYDRATE 2 G: 2 INJECTION, SOLUTION INTRAVENOUS at 06:09

## 2025-07-28 RX ADMIN — POTASSIUM & SODIUM PHOSPHATES POWDER PACK 280-160-250 MG 1 PACKET: 280-160-250 PACK at 16:21

## 2025-07-28 RX ADMIN — POTASSIUM & SODIUM PHOSPHATES POWDER PACK 280-160-250 MG 1 PACKET: 280-160-250 PACK at 21:21

## 2025-07-28 RX ADMIN — POTASSIUM CHLORIDE 20 MEQ: 29.8 INJECTION, SOLUTION INTRAVENOUS at 06:05

## 2025-07-28 RX ADMIN — FLUTICASONE FUROATE AND VILANTEROL TRIFENATATE 1 PUFF: 100; 25 POWDER RESPIRATORY (INHALATION) at 07:53

## 2025-07-28 RX ADMIN — FLUTICASONE PROPIONATE 1 SPRAY: 50 SPRAY, METERED NASAL at 07:53

## 2025-07-28 ASSESSMENT — ACTIVITIES OF DAILY LIVING (ADL)
ADLS_ACUITY_SCORE: 38
ADLS_ACUITY_SCORE: 46
ADLS_ACUITY_SCORE: 46
ADLS_ACUITY_SCORE: 38
ADLS_ACUITY_SCORE: 39
ADLS_ACUITY_SCORE: 46
ADLS_ACUITY_SCORE: 38
ADLS_ACUITY_SCORE: 39
ADLS_ACUITY_SCORE: 39
ADLS_ACUITY_SCORE: 38
ADLS_ACUITY_SCORE: 39
ADLS_ACUITY_SCORE: 46
ADLS_ACUITY_SCORE: 38
ADLS_ACUITY_SCORE: 46
ADLS_ACUITY_SCORE: 38
ADLS_ACUITY_SCORE: 38
ADLS_ACUITY_SCORE: 39
ADLS_ACUITY_SCORE: 38
ADLS_ACUITY_SCORE: 46
ADLS_ACUITY_SCORE: 39
ADLS_ACUITY_SCORE: 46

## 2025-07-28 NOTE — PLAN OF CARE
"[facility] - ICU    RN Progress Note:            Pertinent Assessments:      Please refer to flowsheet rows for full assessment     A&Ox4. Up to bathroom with standby assist, no reports of dizziness. VSS. Tolerating clear liquid diet.            Key Events - This Shift:     -Mars catheter and internal jugular catheter removed     RN Managed Protocols Ordered:  Yes  Protocols:Potassium and Magnesium  Protocols Status: Reviewed with Oncoming RN                Barriers to Discharge / Downgrade:     -transferred to P2     Goal Outcome Evaluation:      Plan of Care Reviewed With: patient    Overall Patient Progress: improvingOverall Patient Progress: improving    Outcome Evaluation: moved to P2      Problem: Adult Inpatient Plan of Care  Goal: Plan of Care Review  Description: The Plan of Care Review/Shift note should be completed every shift.  The Outcome Evaluation is a brief statement about your assessment that the patient is improving, declining, or no change.  This information will be displayed automatically on your shift  note.  Outcome: Progressing  Flowsheets (Taken 7/28/2025 1337)  Outcome Evaluation: moved to P2  Plan of Care Reviewed With: patient  Overall Patient Progress: improving  Goal: Patient-Specific Goal (Individualized)  Description: You can add care plan individualizations to a care plan. Examples of Individualization might be:  \"Parent requests to be called daily at 9am for status\", \"I have a hard time hearing out of my right ear\", or \"Do not touch me to wake me up as it startles  me\".  Outcome: Progressing  Goal: Absence of Hospital-Acquired Illness or Injury  Outcome: Progressing  Intervention: Identify and Manage Fall Risk  Recent Flowsheet Documentation  Taken 7/28/2025 0800 by Emmanuel Alegria, RN  Safety Promotion/Fall Prevention: activity supervised  Intervention: Prevent Skin Injury  Recent Flowsheet Documentation  Taken 7/28/2025 0822 by Emmanuel Alegria, RN  Body " Position: position changed independently  Goal: Optimal Comfort and Wellbeing  Outcome: Progressing  Intervention: Provide Person-Centered Care  Recent Flowsheet Documentation  Taken 7/28/2025 0800 by Emmanuel Alegria RN  Trust Relationship/Rapport:   care explained   choices provided  Goal: Readiness for Transition of Care  Outcome: Progressing     Problem: Delirium  Goal: Optimal Coping  Outcome: Progressing  Goal: Improved Behavioral Control  Outcome: Progressing  Intervention: Minimize Safety Risk  Recent Flowsheet Documentation  Taken 7/28/2025 0800 by Emmanuel Alegria RN  Enhanced Safety Measures: room near unit station  Trust Relationship/Rapport:   care explained   choices provided  Goal: Improved Attention and Thought Clarity  Outcome: Progressing  Goal: Improved Sleep  Outcome: Progressing     Problem: Gastrointestinal Bleeding  Goal: Optimal Coping with Acute Illness  Outcome: Progressing  Goal: Hemostasis  Outcome: Progressing     Problem: Pain Acute  Goal: Optimal Pain Control and Function  Outcome: Progressing  Intervention: Prevent or Manage Pain  Recent Flowsheet Documentation  Taken 7/28/2025 0800 by Emmanuel Alegria RN  Medication Review/Management: medications reviewed     Problem: Risk for Delirium  Goal: Optimal Coping  Outcome: Progressing  Goal: Improved Behavioral Control  Outcome: Progressing  Intervention: Minimize Safety Risk  Recent Flowsheet Documentation  Taken 7/28/2025 0800 by Emmanuel Alegria RN  Enhanced Safety Measures: room near unit station  Trust Relationship/Rapport:   care explained   choices provided  Goal: Improved Attention and Thought Clarity  Outcome: Progressing  Goal: Improved Sleep  Outcome: Progressing

## 2025-07-28 NOTE — PROGRESS NOTES
[facility]:  CRITICAL CARE PROGRESS NOTE     Date / Time of Admission:  7/24/2025 11:05 PM    ID: Juan Luis Mims is a 70 year old male with history of diverticular disease, colonic polyp, recently diagnosed COPD, seasonal allergies, and glaucoma who presented to [facility] ED on 7/24/2025 due to rectal bleeding, admitted for evaluation of acute lower GI bleed.  Colonoscopy 7/26/2025 without stigmata of active/recent bleeding.  Subsequent decompensation 7/27 with presyncopal event, acute active lower GI bleed, necessitating ICU transfer for management of hemorrhagic shock and the setting of active bleeding from the ascending colon.          Changes for today:   Remove mercado catheter.  Remove internal jugular cortis catheter.  Discontinue IV infusions.  Advance diet.  Clear liquid for now, advance to regular as tolerates.   Consider additional Lasix 20 mg IV x 1 today if I/O net positive.   Continue PPI orally daily. GI team as signed off.   Advance mobility, OOB to chair.   Step down to med/surg status.  Reviewed with patient, staff.  Hospitalist team will resume care.          Assessment/Plan:   Neurologic: Glaucoma.    Pain control: Tylenol, PRNs as needed.   Continue PTA latanoprost     Pulmonary: Recently diagnosed COPD, new-onset wheezing, history of seasonal allergies.  Patient developed wheezing 7/27 after initiation of massive transfusion protocol, concern for potentially pulmonary edema developing.  Not yet concerned for an acute COPD exacerbation but will continue to monitor.  Given Lasix 20 mg IV x 1 on 7/27 with improvement. Spirometry completed as outpatient 7/16, this showed FEV1 1.45 L (53.3 % predicted), FEV1/FVC 58.81, reported as moderately-severely obstructive process, unable to perform bronchodilator challenge.  Wean supplemental O2 as tolerated; goal O2 sat > 92%.  HOB > 30 degrees to limit aspiration risk.    Continue Breo Ellipta (substitute for home  Advair)  Albuterol nebs Q4H PRN wheezing/dyspnea  Continue PTA allegra, flonase      Cardiovascular: Hemorrhagic shock - resolved.  Patient with active exsanguination related to lower GI bleed, found to have active extravasation at the ascending colon on CTA GI Bleed study 7/27.  No history of cardiac disease. Transiently on norepinephrine gtt.   Cardiac monitoring.  SBP >= 90 mmHg, MAP >= 65 mmHg.  EKG PRN.  Off vasopressors and not requiring massive blood transfusion protocol - will remove RIJ cordis.      GI/: Acute lower GI bleed with active extravasation at the ascending colon s/p mucosal arteries embolization x 2 on 7/27, large internal hemorrhoids, history of diverticular disease and colonic polyp. Underwent colonoscopy on 7/26 which showed an inadequate prep, blood in the entire examined colon was noted but no signs of recent bleeding or active bleeding.  Also found to have internal hemorrhoids during retroflexion; the hemorrhoids were large.  Acute hemorrhage 7/27 with massive transfusion protocol initiated. Visceral angiography completed with IR on 727, SMA angiography demonstrated active bleeding in the region of the hepatic flexure, which correlates with CT findings. Ascending colic artery angiography confirms active bleeding from a lateral branch.  Active bleeding from 2 separate mucosal arteries from the ascending colic artery/hepatic flexure which were successfully treated with coil embolization.   Appreciate GI and IR consultations.  Received embolization, no transfusions required since then.  Explained to patient that he will continue to pass bloody stool as he clears the hematochezia/melena.   Nutrition: Start Clear liquid diet, advance to regular diet as tolerates.  Last BM:  7/28.  Meds: None.  GI prophylaxis: PPI daily.     Renal: Mild renal insufficiency - improved, volume overload - improved, hypocalcemia - improved, metabolic acidosis (improved), hypoalbuminemia.  Mild renal insufficiency  noted on lab studies 7/27 (Cr 1.08, baseline 0.85) in setting of acute hemorrhage. Received contrast load x 2 on 7/27 and also previously on day of eval in ED on 7/24.  Thus far Cr improving. Calcium chelation with extensive transfusions, received total 4 gm IV and iCa today normalized. Otherwise, had wheezing develop on 7/27, concern for volume overload with pulm edema, treated with lasix 20 mg iv x 1 with improvement.  Monitor I/O's.  Electrolyte repletion PRN.  Avoid/limit nephrotoxic agents.  IVFs: Saline lock. Discontinued IV fluid.  Holding off on lasix again this AM, plan for I/O net negative today and consider re-dosing if net positive.     Heme/Onc: Hemorrhagic shock with GI bleed. Thrombocytopenia. Normocytic anemia. Coagulopathy.  Acute hemorrhage 7/27 with massive transfusion protocol initiated, received:  9 units pRBC, 4 units FFP, 2 unit platelets, 2 unit cryoprecipitate  Hgb stable - reduce to Q12H checks x 3  CBC in the AM.   Remove Cordis/Introducer catheter, not requiring vasopressors or massive transfusion protocol  DVT prophylaxis: SCDs.     Endocrine: No issues.    FSBG Q4H, Aspart insulin SS (low dose).  Hypoglycemia protocol.     Infectious diseases: No issues.    General precautions     Rheum/Musculoskeletal:   Activity: Advance out of bed to chair      RISK FACTORS PRESENT ON ADMISSION:  Clinically Significant Risk Factors        # Hypokalemia: Lowest K = 3.1 mmol/L in last 2 days, will replace as needed     # Hyperchloremia: Highest Cl = 109 mmol/L in last 2 days, will monitor as appropriate        # Hypocalcemia: Lowest iCa = 3.3 mg/dL in last 2 days, will monitor and replace as appropriate   # Hypomagnesemia: Lowest Mg = 1.5 mg/dL in last 2 days, will replace as needed   # Hypoalbuminemia: Lowest albumin = 2.7 g/dL at 7/27/2025  5:54 AM, will monitor as appropriate     # Thrombocytopenia: Lowest platelets = 98 in last 2 days, will monitor for bleeding                  # Overweight:  "Estimated body mass index is 29.7 kg/m  as calculated from the following:    Height as of this encounter: 1.702 m (5' 7\").    Weight as of this encounter: 86 kg (189 lb 9.6 oz).  , PRESENT ON ADMISSION       # Financial/Environmental Concerns: none                Lines/Drains/Tubes:  Right internal jugular Cordis/Introducer central line, placed 7/27  Mars catheter, placed 7/27     Code Status:  Full Code     The patient and/or the family was educated about the above plan of care and indicated understanding.       This patient is no longer considered critically ill and may step down from the ICU. Hospitalist Dr. Shields aware and will resume care of the patient. Downgrade orders placed by myself. Critical care team will sign off. .     Total Patient Care time, not including separate billable procedure time: 40 minutes.    Katiuska Talley MD, MPH  Pulmonary/Critical Care Medicine  07/28/2025   10:45 AM         ICU DAILY CHECKLIST:   ICU DAILY CHECKLIST           Can patient transfer out of MICU? yes    FAST HUG:    Feeding:  yes.  Patient is receiving ORAL    Mars: yes  Analgesia/Sedation: no;    Thromboembolic prophylaxis: yes; Mode:  SCDs  HOB>30:  yes  Stress Ulcer Protocol Active: yes; Mode: PPI  Glycemic Control: Any glucose > 180 no; Mode of Insulin Therapy: Sliding Scale Insulin    INTUBATED:  Can patient have daily waking:  n/a  Can patient have spontaneous breathing trial:  n/a    Restraints? no    PHYSICAL THERAPY AND MOBILITY:  Can patient have PT and mobility trial: yes  Activity: OOB to Chair        Subjective/Interval History:   On 7/25, had recurrent hematochezia, syncopal event and patient found on the floor. Rapid response called, transferred to the ICU for continued monitoring. Gastroenterology team consulted, underwent colonoscopy on 7/26 which showed an inadequate prep, blood in the entire examined colon was noted but no signs of recent bleeding or active bleeding. Also found to have internal " hemorrhoids during retroflexion; the hemorrhoids were large.     7/27: Sitting in the chair when he developed presyncopal symptoms of lightheadedness, dizziness, and had recurrent bleeding from the rectum.  He subsequently developed transient hypotension and tachycardia, given NS 1000 mL bolus.  Was sent for CTA abdomen study to again evaluate for GI bleed, while he was in the scanner continue to have active bleeding per rectum and developed recurrent hypotension.  Peripheral norepinephrine initiated.  Upon ICU return, massive transfusion protocol initiated, right IJ Cordis catheter placed emergently, and CTA resulted with active extravasation from the ascending colon. IR consulted.  Went for emergent angiography, found to have active bleeding from 2 separate mucosal arteries from the ascending colic artery/hepatic flexure which were successfully treated with coil embolization.     Given lasix 20 mg IV x 1 in afternoon due to wheezing.   Given calcium IV  Electrolytes replaced    Overnight, no issues.     Review of Systems:  The Review of Systems is negative other than noted in the HPI        Allergies/Medications:   Allergies:   No Known Allergies    Continuous Infusions:  Current Facility-Administered Medications   Medication Dose Route Frequency Provider Last Rate Last Admin    Reason statin not prescribed   Oral DOES NOT GO TO Marv Cerda MD         Scheduled Medications:  Current Facility-Administered Medications   Medication Dose Route Frequency Provider Last Rate Last Admin    fexofenadine (ALLEGRA) tablet 180 mg  180 mg Oral Daily Oniel Larson MD   180 mg at 07/25/25 0855    fluticasone (FLONASE) 50 MCG/ACT spray 1 spray  1 spray Both Nostrils Daily nOiel Larson MD   1 spray at 07/28/25 0753    fluticasone-vilanterol (BREO ELLIPTA) 100-25 MCG/ACT inhaler 1 puff  1 puff Inhalation Daily Oniel Larson MD   1 puff at 07/28/25 0753    insulin aspart (NovoLOG) injection (RAPID ACTING)   "1-4 Units Subcutaneous Q4H Katiuska Talley MD        latanoprost (XALATAN) 0.005 % ophthalmic solution 1 drop  1 drop Right Eye At Bedtime Oniel Larson MD   1 drop at 07/27/25 2149    [START ON 7/29/2025] pantoprazole (PROTONIX) EC tablet 40 mg  40 mg Oral QAM AC Elmer Maradiaga, DO               Objective:   Vitals:  /58   Pulse 83   Temp 98.3  F (36.8  C) (Oral)   Resp 22   Ht 1.702 m (5' 7\")   Wt 86 kg (189 lb 9.6 oz)   SpO2 96%   BMI 29.70 kg/m    Vent: Resp: 22  GEN: Pleasant male, slightly fatigued, no acute distress  HEENT: Normocephalic, atraumatic.  Extraoccular eye movements intact, anicteric sclera. Moist mucous membranes.   NECK: Supple.  RIJ Cordis  PULM: Non-labored breathing.  No use of accessory muscles.  Clear to ausculation bilaterally.   CVS: Regular rate and rhythm.  Normal S1, S2.  No rubs, murmurs, or gallops.    ABDOMEN: Normoactive bowel sounds.  Non-tender to palpation.  Non-distended.    EXTREMITES:  No clubbing, cyanosis. Trace pitting edema.  NEURO:  Awake.  Oriented to person, place, time and situation.  Cranial nerves 2-12 grossly intact.  Moving all extremities.      Intake/Output: I/O last 3 completed shifts:  In: 5191   Out: 4100 [Urine:4100]        Pertinent Studies:   All laboratory data reviewed  Serum Glucose range:   Recent Labs   Lab 07/28/25  0809 07/28/25  0526 07/28/25  0402 07/28/25  0013   * 117* 124* 127*     ABG: No lab results found in last 7 days.  CBC:   Recent Labs   Lab 07/28/25  0526 07/28/25  0150 07/27/25  2148 07/27/25  1827 07/27/25  1555 07/27/25  1204 07/25/25  0148 07/24/25  2322   WBC 8.4  --   --   --  11.4* 7.6   < > 7.8   HGB 8.8*  8.8* 8.9* 9.1*   < > 9.8* 9.7*  9.7*   < > 12.3*   HCT 24.9*  --   --   --  27.3* 29.3*   < > 37.4*   MCV 86  86 86 86   < > 84 88  88   < > 88   PLT 98*  --   --   --  102* 98*   < > 258   NEUTROPHIL  --   --   --   --   --   --   --  49   LYMPH  --   --   --   --   --   --   --  37 " "  MONOCYTE  --   --   --   --   --   --   --  8   EOSINOPHIL  --   --   --   --   --   --   --  5    < > = values in this interval not displayed.     Chemistry:   Recent Labs   Lab 07/28/25  0526 07/27/25  2148 07/27/25  1555 07/27/25  1204 07/27/25  0554 07/25/25  1824     --   --  139 140 137   POTASSIUM 3.8 3.6 3.1* 3.5  3.5 3.8 4.3   CHLORIDE 107  --   --  108* 109* 107   CO2 27  --   --  24 28 19*   BUN 11.1  --   --   --  9.5 11.9   CR 0.93  --   --   --  1.08 0.85   GFRESTIMATED 89  --   --   --  75 >90   SUZE 7.7*  --   --   --  7.5* 7.8*   MAG 1.9  --  1.6* 1.5*  --   --    PROTTOTAL 4.4*  --   --   --  4.2*  --    ALBUMIN 2.9*  --   --   --  2.7*  --    AST 20  --   --   --  22  --    ALT 14  --   --   --  16  --    ALKPHOS 46  --   --   --  43  --    BILITOTAL 1.0  --   --   --  0.6  --      Coags:  Recent Labs   Lab 07/28/25  0526 07/27/25  1204   INR 1.11 1.24*   PROTIME 14.5 15.8*   PTT  --  31     Cardiac Markers:  No results for input(s): \"CKTOTAL\", \"TROPONINI\" in the last 168 hours.     Microbiology:  None        Cardiology/Radiology:   Cardiac: All cardiac studies reviewed by me.  EKG:  Reviewed  TTE:  Summary: None    Radiology:  All imaging studies reviewed by me.  Chest X-Ray, 7/27:  Right IJ sheath likely at the junction of the IJ and innominate veins. No pneumothorax. The lungs are clear.   CTA GI Bleed study, 7/27:  FINDINGS: ANGIOGRAM ABDOMEN/PELVIS: Normal caliber abdominal aorta. No dissection. Mild calcified atherosclerotic plaque. Origins of the celiac axis, SMA, single bilateral renal arteries, and MILA are widely patent. Normal caliber iliac and visualized femoral arteries without significant stenosis. There is active arterial extravasation in the ascending colon (series 6, image 164). LOWER CHEST: Minimal bilateral pleural fluid and mild bibasilar atelectasis/scarring. HEPATOBILIARY: Cholelithiasis. PANCREAS: Normal. SPLEEN: Normal. ADRENAL GLANDS: Normal. KIDNEYS/BLADDER: No " hydronephrosis. Nonobstructing right renal calculi. Few renal hypodensities are too small to characterize, for which no dedicated imaging follow-up is required. BOWEL: No bowel obstruction. Few colonic diverticula without acute diverticulitis. Normal appendix. No ascites or pneumoperitoneum. LYMPH NODES: Normal. PELVIC ORGANS: Normal.   MUSCULOSKELETAL: Thoracolumbar spondylosis. No destructive osseous lesion. IMPRESSION: 1.  Active arterial extravasation within the ascending colon. 2.  Cholelithiasis. 3.  Nonobstructing right renal calculi.

## 2025-07-28 NOTE — PROGRESS NOTES
[facility] Progress Note - Hospitalist Service    Date of Admission:  7/24/2025    Assessment & Plan   Juan Luis Mims is a 70 year old male admitted on 7/24/2025. He came to the ED for evaluation of rectal bleed    Acute lower GI bleed, diverticular bleed  - Mesenteric CT on admission and repeated on 7/25 without active bleeding.  - Colonoscopy 7/26 showed extensive diverticulosis of transverse, descending and sigmoid colon's, without active bleeding, although colon prep was not optimal.  - D/W patient clinical presentation dynamics of diverticular bleed, and with recurrences may need surgical treatment.  - Additional GI bleeding morning of 7/27 requiring massive transfusion, underwent mesenteric angiogram with embolization of the hepatic flexure branches 7/27  - GI consulted, have signed off  - Advancing diet as tolerated    Acute blood loss anemia   - baseline hemoglobin 12,  - S/p total 3 units PRBCs 7/26, massive transfusion 7/27  -Monitoring hemoglobin every 12 hours  - Continue Protonix 40 mg daily    Syncope and collapse on 7/25, likely due to orthostatic hypotension/from GI bleed.  Left eighth rib fracture, from the fall  - CTA 7/25: Minimally displaced acute appearing left lateral eighth rib fracture  - No other traumatic injuries.  - Pain management  - I-S    COPD without exacerbation  - Recent diagnosis, lifetime non-smoker  - Continue PTA Advair, fluticasone nasal spray, fexofenadine  - DuoNebs as needed    Hypomagnesemia  Hypokalemia  -Will monitor and replace with RN replacement protocol        Diet: Advance Diet as Tolerated: Clear Liquid Diet; Regular Diet Adult    DVT Prophylaxis: Pneumatic Compression Devices  Mars Catheter: Not present  Lines: None     Cardiac Monitoring: ACTIVE order. Indication: ICU  Code Status: Full Code      Clinically Significant Risk Factors        # Hypokalemia: Lowest K = 3.1 mmol/L in last 2 days, will replace as needed   #  "Hyperchloremia: Highest Cl = 109 mmol/L in last 2 days, will monitor as appropriate      # Hypocalcemia: Lowest iCa = 3.3 mg/dL in last 2 days, will monitor and replace as appropriate   # Hypomagnesemia: Lowest Mg = 1.5 mg/dL in last 2 days, will replace as needed   # Hypoalbuminemia: Lowest albumin = 2.7 g/dL at 7/27/2025  5:54 AM, will monitor as appropriate   # Thrombocytopenia: Lowest platelets = 98 in last 2 days, will monitor for bleeding              # Overweight: Estimated body mass index is 29.7 kg/m  as calculated from the following:    Height as of this encounter: 1.702 m (5' 7\").    Weight as of this encounter: 86 kg (189 lb 9.6 oz)., PRESENT ON ADMISSION     # Financial/Environmental Concerns: none         Social Drivers of Health            Disposition Plan     Medically Ready for Discharge: Anticipated in 2-4 Days             Brenna Shields MD  Hospitalist Service  [facility]  Securely message with MVER[employer] (more info)  Text page via [employer] Paging/Directory   ______________________________________________________________________    Interval History   Mr. Mims is doing much better today.  He continues to have what appears to be old blood but no bright red blood noted.  He denies lightheadedness though he did have an episode of that earlier in the morning.  He is having no acute complaints.  Will be transferred out of ICU today.  Will continue to closely monitor hemoglobin and allow for diet to advance.    Physical Exam   Vital Signs: Temp: 98  F (36.7  C) Temp src: Oral BP: 124/58 Pulse: 91   Resp: 18 SpO2: 97 % O2 Device: None (Room air)    Weight: 189 lbs 9.6 oz    General Appearance: Awake, alert, in no acute distress  Respiratory: CTAB, no wheeze  Cardiovascular: RRR, no murmur noted  GI: soft, nontender, non distended, normal bowel sounds  Skin: no jaundice, no rash    Medical Decision Making       50 MINUTES SPENT BY ME on the date of service doing chart review, " history, exam, documentation & further activities per the note.      Data     I have personally reviewed the following data over the past 24 hrs:    8.4  \   8.8 (L); 8.8 (L)   / 98 (L)     143 107 11.1 /  113 (H)   3.8 27 0.93 \     ALT: 14 AST: 20 AP: 46 TBILI: 1.0   ALB: 2.9 (L) TOT PROTEIN: 4.4 (L) LIPASE: N/A     INR:  1.11 PTT:  N/A   D-dimer:  N/A Fibrinogen:  N/A       Imaging results reviewed over the past 24 hrs:   No results found for this or any previous visit (from the past 24 hours).

## 2025-07-28 NOTE — PLAN OF CARE
[facility] - ICU    RN Progress Note:            Pertinent Assessments:      Please refer to flowsheet rows for full assessment     Vital signs stable,no fever, continues to have dark bloody stools,Hgb every four hours trending down. We will continue to monitor.           Key Events - This Shift:       Uneventful     RN Managed Protocols Ordered:  Yes  Protocols:Potassium and Magnesium  PRN'S:  Protocols Status: Reviewed with Oncoming RN                Barriers to Discharge / Downgrade:     On going rectal bleed.         Point of Contact Update: YES-OR-NO: Yes  If No, reason:   Name:  Phone Number:  Summary of Conversation:

## 2025-07-28 NOTE — PROGRESS NOTES
[facility] Progress Note    Subjective:    No further rectal bleeding reported by nursing staff.  Patient denies abdominal pain.    Objective:  Vital signs in last 24 hours  Temp:  [98.2  F (36.8  C)-99  F (37.2  C)] 98.3  F (36.8  C)  Pulse:  [] 83  Resp:  [11-41] 22  BP: ()/() 115/58  SpO2:  [88 %-100 %] 96 % O2 Device: None (Room air)      Constitutional: healthy, alert, and no distress   Cardiovascular: Normal rate  Respiratory: Normal respiratory rate  Abdomen: no pain upon abdominal palpation  NEURO: moves all extremities spontaneously   JOINT/EXTREMITIES: extremities normal- no gross deformities noted and normal muscle tone      LABORATORY    ELECTROLYTE PANEL   Recent Labs   Lab 07/28/25  0809 07/28/25  0526 07/28/25  0402 07/28/25  0013 07/27/25 2148 07/27/25  1601 07/27/25  1555 07/27/25  1204 07/27/25  0554 07/25/25 2014 07/25/25  1824   NA  --  143  --   --   --   --   --  139 140  --  137   POTASSIUM  --  3.8  --   --  3.6  --  3.1* 3.5  3.5 3.8  --  4.3   CHLORIDE  --  107  --   --   --   --   --  108* 109*  --  107   CO2  --  27  --   --   --   --   --  24 28  --  19*   * 117* 124*   < >  --    < >  --   --  124*   < > 164*   CR  --  0.93  --   --   --   --   --   --  1.08  --  0.85   BUN  --  11.1  --   --   --   --   --   --  9.5  --  11.9    < > = values in this interval not displayed.      HEMATOLOGY PANEL   Recent Labs   Lab 07/28/25  0526 07/28/25  0150 07/27/25 2148 07/27/25  1827 07/27/25  1555 07/27/25  1204   HGB 8.8*  8.8* 8.9* 9.1*   < > 9.8* 9.7*  9.7*   MCV 86  86 86 86   < > 84 88  88   WBC 8.4  --   --   --  11.4* 7.6   PLT 98*  --   --   --  102* 98*   INR 1.11  --   --   --   --  1.24*    < > = values in this interval not displayed.      LIVER AND PANCREAS PANEL   Recent Labs   Lab 07/28/25  0526 07/27/25  0554   AST 20 22   ALT 14 16   ALKPHOS 46 43   BILITOTAL 1.0 0.6     IMAGING STUDIES    CT Head w/o Contrast  Result Date:  7/25/2025  EXAM: CT HEAD W/O CONTRAST LOCATION: [facility] DATE: 07/25/2025 INDICATION: Fall, questionable head impact. COMPARISON: Brain MRI 07/11/2010. TECHNIQUE: Routine CT Head without IV contrast. Multiplanar reformats. Dose reduction techniques were used. FINDINGS: INTRACRANIAL CONTENTS: Bilateral basal ganglia calcifications. No intracranial hemorrhage, extra-axial collection, or mass effect. No CT evidence of acute infarct. Normal parenchymal attenuation. Normal ventricles and sulci. VISUALIZED ORBITS/SINUSES/MASTOIDS: No intraorbital abnormality. No paranasal sinus mucosal disease. No middle ear or mastoid effusion. BONES/SOFT TISSUES: No acute abnormality.     IMPRESSION: 1.  Normal head CT.    CT Cervical Spine w/o Contrast  Result Date: 7/25/2025  EXAM: CT CERVICAL SPINE W/O CONTRAST LOCATION: [facility] DATE: 7/25/2025 INDICATION: Fall, question head impact. COMPARISON: None. TECHNIQUE: Routine CT Cervical Spine without IV contrast. Multiplanar reformats. Dose reduction techniques were used.     IMPRESSION: There is normal alignment of the cervical vertebrae; however, there is reversal of normal cervical lordosis centered at the C3 level. Vertebral body heights of the cervical spine are normal. Craniocervical alignment is normal. No fractures. There is loss of disc space height and degenerative endplate spurring at C5-C6. Minimal facet arthropathy throughout the cervical spine. Minimal posterior disc bulging at C2-C3, C3-C4 and C4-C5. No spinal canal stenosis. No prevertebral soft tissue swelling.    CTA GI Bleed  Result Date: 7/25/2025  EXAM: CTA GI BLEED LOCATION: [facility] DATE: 7/25/2025 INDICATION: Hematochezia ongoing with syncopal event COMPARISON: CT angiogram 7/24/2025 TECHNIQUE: CT angiogram abdomen pelvis during arterial phase of injection of IV contrast. 2D and 3D MIP reconstructions were performed by the CT  technologist. Dose reduction techniques were used. CONTRAST: ISOVUE 370 75ML FINDINGS: ANGIOGRAM ABDOMEN/PELVIS: There is no evidence of abdominal aortic aneurysm or dissection. Mild calcified and noncalcified atherosclerosis. The celiac, superior mesenteric, bilateral renal and inferior mesenteric arteries widely patent. No evidence of hemodynamically significant stenosis in the pelvis. No evidence of active peritoneal or retroperitoneal hemorrhage. Specifically, no evidence of active, intraluminal gastrointestinal hemorrhage. LOWER CHEST: Unremarkable. HEPATOBILIARY: Cholelithiasis without evidence of acute cholecystitis or biliary obstruction. PANCREAS: Normal. SPLEEN: Multiple calcified splenic granulomas. ADRENAL GLANDS: Normal. KIDNEYS/BLADDER: Multiple nonobstructing right renal calculi. No ureterolithiasis or hydronephrosis bilaterally. Urinary bladder is unremarkable. BOWEL: Fluid is noted throughout the length of the colon. Scattered diverticuli without evidence of acute diverticulitis. No definite evidence of active, intraluminal gastrointestinal hemorrhage. Of note, there are several hyperdense foci in the descending and sigmoid colon which are unchanged between pre and postcontrast images. No evidence of mechanical bowel obstruction or acute inflammation. LYMPH NODES: No suspicious lymphadenopathy. No abdominopelvic free fluid. PELVIC ORGANS: Mild prostatomegaly. MUSCULOSKELETAL: Minimally displaced, acute-appearing left lateral eighth rib fracture (series 10 image 79). No additional acute bony abnormality.     IMPRESSION: 1.  Fluid throughout the length of the colon, suggestive of diarrheal illness. No definite active, intraluminal gastrointestinal hemorrhage. 2.  Minimally displaced, acute-appearing left lateral eighth rib fracture.    CTA GI Bleed  Result Date: 7/25/2025  EXAM: CTA GI BLEED LOCATION: [facility] DATE: 7/25/2025 INDICATION: rectal bleeding today COMPARISON: CT  abdomen/pelvis 6/13/2023 TECHNIQUE: CT angiogram abdomen pelvis during arterial phase of injection of IV contrast. 2D and 3D MIP reconstructions were performed by the CT technologist. Dose reduction techniques were used. CONTRAST: 90ml isovue 370 FINDINGS: ANGIOGRAM ABDOMEN/PELVIS: No intraluminal extravasation of contrast in small bowel or large bowel. Abdominal Aorta: Patent and normal in caliber. Mild aortic calcifications. No evidence of dissection or penetrating ulcer. Celiac Artery: Patent. Superior Mesenteric Artery: Patent. Right Renal Artery: Patent. Left Renal Artery: Patent. Inferior Mesenteric Artery: Patent. Right Common Iliac Artery: Patent. Right External Iliac Artery: Patent. Right Internal Iliac Artery: Patent. Right Common Femoral Artery: Patent. Proximal Right Superficial Femoral Artery: Patent. Proximal Right Deep Femoral Artery: Patent. Left Common Iliac Artery: Patent. Left External Iliac Artery: Patent. Left Internal Iliac Artery: Patent. Left Common Femoral Artery: Patent. Proximal Left Superficial Femoral Artery: Patent. Proximal Left Deep Femoral Artery: Patent. LOWER CHEST: Unremarkable. HEPATOBILIARY: Liver appears normal. Multiple tiny gallstones in the gallbladder. No significant gallbladder distention. No biliary dilatation. PANCREAS: Normal. SPLEEN: Punctate calcifications in the spleen, compatible with old granulomatous disease. ADRENAL GLANDS: Normal. KIDNEYS/BLADDER: Few nonobstructing right renal calculi measuring up to 0.3 cm. No hydronephrosis. Kidneys appear normal. Urinary bladder is mostly decompressed. BOWEL: No intraluminal extravasation of contrast in small bowel or large bowel. Mild colonic diverticulosis. No evidence of acute diverticulitis. Small bowel appears normal. No bowel obstruction. Normal appendix. No evidence of acute appendicitis. LYMPH NODES: No lymphadenopathy. PELVIC ORGANS: Unremarkable. MUSCULOSKELETAL: Fat-containing left inguinal hernia. Multilevel  degenerative changes of the spine.     IMPRESSION: 1.  No active gastrointestinal bleeding identified. 2.  Mild colonic diverticulosis. No evidence of acute diverticulitis.      I have reviewed the current diagnostic and laboratory tests.                Assessment:  He is a 70-year-old male with a history of COPD, seasonal allergies, hyperlipidemia, and recent back pain for which he was taking ibuprofen and naproxen. He presents with several episodes of rectal bleeding and cramping. Initial CT angio was negative for active bleeding or obvious inflammation. Hemoglobin has dropped slightly.     #hematochezia, likely diverticular bleed    Colonoscopy was performed yesterday.  Upon careful inspection blood was seen in the entire colon but no actual cause of bleeding/recent bleeding was encountered.  Likely diverticular in nature.  TI showed some mild blood-tinged liquid but likely reflux.  No intervention was performed.  - Status post IR intervention with coil embolization to the ascending/hepatic flexure, after CTA showing active arterial extravasation in the ascending colon    Plan:  -Continue to monitor stools for overt evidence of GI blood loss  -Monitor hemodynamics  -PO PPI once daily  -no further recommendations from GI standpoint, we will sign-off, please call or page with questions or if patient shows overt evidence of GI blood loss                                                   Elmer Maradiaga, DO  Thank you for the opportunity to participate in the care of this patient.   Please feel free to call me with any questions or concerns.  Phone number [phone].      Patient Active Problem List   Diagnosis    Bright red rectal bleeding    GI bleed    Diverticular hemorrhage

## 2025-07-29 ENCOUNTER — APPOINTMENT (OUTPATIENT)
Dept: PHYSICAL THERAPY | Facility: HOSPITAL | Age: 70
DRG: 356 | End: 2025-07-29
Attending: INTERNAL MEDICINE
Payer: MEDICARE

## 2025-07-29 ENCOUNTER — APPOINTMENT (OUTPATIENT)
Dept: OCCUPATIONAL THERAPY | Facility: HOSPITAL | Age: 70
DRG: 356 | End: 2025-07-29
Attending: INTERNAL MEDICINE
Payer: MEDICARE

## 2025-07-29 LAB
ANION GAP SERPL CALCULATED.3IONS-SCNC: 7 MMOL/L (ref 7–15)
BUN SERPL-MCNC: 10.9 MG/DL (ref 8–23)
CALCIUM SERPL-MCNC: 8.2 MG/DL (ref 8.8–10.4)
CHLORIDE SERPL-SCNC: 103 MMOL/L (ref 98–107)
CREAT SERPL-MCNC: 0.99 MG/DL (ref 0.67–1.17)
EGFRCR SERPLBLD CKD-EPI 2021: 83 ML/MIN/1.73M2
ERYTHROCYTE [DISTWIDTH] IN BLOOD BY AUTOMATED COUNT: 15.4 % (ref 10–15)
GLUCOSE BLDC GLUCOMTR-MCNC: 103 MG/DL (ref 70–99)
GLUCOSE BLDC GLUCOMTR-MCNC: 115 MG/DL (ref 70–99)
GLUCOSE BLDC GLUCOMTR-MCNC: 117 MG/DL (ref 70–99)
GLUCOSE BLDC GLUCOMTR-MCNC: 125 MG/DL (ref 70–99)
GLUCOSE SERPL-MCNC: 115 MG/DL (ref 70–99)
HCO3 SERPL-SCNC: 28 MMOL/L (ref 22–29)
HCT VFR BLD AUTO: 25.8 % (ref 40–53)
HGB BLD-MCNC: 9 G/DL (ref 13.3–17.7)
MAGNESIUM SERPL-MCNC: 2.1 MG/DL (ref 1.7–2.3)
MCH RBC QN AUTO: 30.3 PG (ref 26.5–33)
MCHC RBC AUTO-ENTMCNC: 34.9 G/DL (ref 31.5–36.5)
MCV RBC AUTO: 87 FL (ref 78–100)
PHOSPHATE SERPL-MCNC: 3.6 MG/DL (ref 2.5–4.5)
PLATELET # BLD AUTO: 139 10E3/UL (ref 150–450)
POTASSIUM SERPL-SCNC: 3.6 MMOL/L (ref 3.4–5.3)
RBC # BLD AUTO: 2.97 10E6/UL (ref 4.4–5.9)
SODIUM SERPL-SCNC: 138 MMOL/L (ref 135–145)
WBC # BLD AUTO: 6.8 10E3/UL (ref 4–11)

## 2025-07-29 PROCEDURE — 120N000001 HC R&B MED SURG/OB

## 2025-07-29 PROCEDURE — 97116 GAIT TRAINING THERAPY: CPT | Mod: GP

## 2025-07-29 PROCEDURE — 250N000013 HC RX MED GY IP 250 OP 250 PS 637: Performed by: INTERNAL MEDICINE

## 2025-07-29 PROCEDURE — 97535 SELF CARE MNGMENT TRAINING: CPT | Mod: GO

## 2025-07-29 PROCEDURE — 99232 SBSQ HOSP IP/OBS MODERATE 35: CPT | Performed by: INTERNAL MEDICINE

## 2025-07-29 PROCEDURE — 97165 OT EVAL LOW COMPLEX 30 MIN: CPT | Mod: GO

## 2025-07-29 PROCEDURE — 85027 COMPLETE CBC AUTOMATED: CPT | Performed by: INTERNAL MEDICINE

## 2025-07-29 PROCEDURE — 84100 ASSAY OF PHOSPHORUS: CPT | Performed by: INTERNAL MEDICINE

## 2025-07-29 PROCEDURE — 80048 BASIC METABOLIC PNL TOTAL CA: CPT | Performed by: INTERNAL MEDICINE

## 2025-07-29 PROCEDURE — 83735 ASSAY OF MAGNESIUM: CPT | Performed by: INTERNAL MEDICINE

## 2025-07-29 PROCEDURE — 36415 COLL VENOUS BLD VENIPUNCTURE: CPT | Performed by: INTERNAL MEDICINE

## 2025-07-29 PROCEDURE — 97162 PT EVAL MOD COMPLEX 30 MIN: CPT | Mod: GP

## 2025-07-29 RX ORDER — POTASSIUM CHLORIDE 1500 MG/1
20 TABLET, EXTENDED RELEASE ORAL ONCE
Status: COMPLETED | OUTPATIENT
Start: 2025-07-29 | End: 2025-07-29

## 2025-07-29 RX ADMIN — LATANOPROST 1 DROP: 50 SOLUTION/ DROPS OPHTHALMIC at 22:20

## 2025-07-29 RX ADMIN — PANTOPRAZOLE SODIUM 40 MG: 40 TABLET, DELAYED RELEASE ORAL at 06:45

## 2025-07-29 RX ADMIN — FLUTICASONE FUROATE AND VILANTEROL TRIFENATATE 1 PUFF: 100; 25 POWDER RESPIRATORY (INHALATION) at 10:45

## 2025-07-29 RX ADMIN — POTASSIUM CHLORIDE 20 MEQ: 1500 TABLET, EXTENDED RELEASE ORAL at 10:45

## 2025-07-29 RX ADMIN — FEXOFENADINE HYDROCHLORIDE 180 MG: 60 TABLET ORAL at 10:45

## 2025-07-29 RX ADMIN — FLUTICASONE PROPIONATE 1 SPRAY: 50 SPRAY, METERED NASAL at 10:47

## 2025-07-29 ASSESSMENT — ACTIVITIES OF DAILY LIVING (ADL)
ADLS_ACUITY_SCORE: 38

## 2025-07-29 NOTE — PROGRESS NOTES
07/29/25 1330   Appointment Info   Signing Clinician's Name / Credentials (PT) Kala Dobbs DPT   Living Environment   People in Home spouse   Current Living Arrangements house   Home Accessibility stairs to enter home;stairs within home   Number of Stairs, Main Entrance 2   Number of Stairs, Within Home, Primary greater than 10 stairs   Stair Railings, Within Home, Primary railings safe and in good condition   Transportation Anticipated family or friend will provide   Self-Care   Usual Activity Tolerance moderate   Current Activity Tolerance fair   Equipment Currently Used at Home none   Fall history within last six months yes   Number of times patient has fallen within last six months 2   General Information   Onset of Illness/Injury or Date of Surgery 07/24/25   Referring Physician Brenna Shields MD   Patient/Family Therapy Goals Statement (PT) none   Pertinent History of Current Problem (include personal factors and/or comorbidities that impact the POC) 70 year old male admitted on 7/24/2025. He came to the ED for evaluation of rectal bleed.   Existing Precautions/Restrictions fall   Strength (Manual Muscle Testing)   Strength (Manual Muscle Testing) Deficits observed during functional mobility   Bed Mobility   Bed Mobility supine-sit   Supine-Sit Storey (Bed Mobility) supervision;verbal cues   Assistive Device (Bed Mobility) bed rails   Transfers   Transfers sit-stand transfer   Sit-Stand Transfer   Sit-Stand Storey (Transfers) contact guard   Assistive Device (Sit-Stand Transfers) walker, front-wheeled   Gait/Stairs (Locomotion)   Storey Level (Gait) contact guard;verbal cues   Assistive Device (Gait) walker, front-wheeled   Distance in Feet (Gait) 15'   Pattern (Gait) step-through   Deviations/Abnormal Patterns (Gait) gait speed decreased   Clinical Impression   Criteria for Skilled Therapeutic Intervention Yes, treatment indicated   PT Diagnosis (PT) Impaired functional mobility    Influenced by the following impairments Fatigue, balance deficits   Functional limitations due to impairments Impaired transfers, gait. strength   Clinical Presentation (PT Evaluation Complexity) stable   Clinical Presentation Rationale Presents as diagnosed   Clinical Decision Making (Complexity) moderate complexity   Planned Therapy Interventions (PT) balance training;bed mobility training;gait training;home exercise program;stair training;strengthening;transfer training   Risk & Benefits of therapy have been explained patient   PT Total Evaluation Time   PT Eval, Moderate Complexity Minutes (52336) 10   Physical Therapy Goals   PT Frequency Daily   PT Predicted Duration/Target Date for Goal Attainment 08/05/25   PT Goals Bed Mobility;Transfers;Gait;Stairs   PT: Bed Mobility Independent;Supine to/from sit   PT: Transfers Modified independent;Bed to/from chair;Assistive device;Sit to/from stand   PT: Gait Modified independent;Assistive device;150 feet   PT: Stairs Supervision/stand-by assist;Greater than 10 stairs   PT Discharge Planning   PT Plan progress transfers, amb with/out AD, 10+ stairs   PT Discharge Recommendation (DC Rec) home with assist;home with home care physical therapy   PT Rationale for DC Rec Pt may benefit from home PT to improve overall balance and mobility.   PT Brief overview of current status Amb 285' with CGA and FWW.   PT Total Distance Amb During Session (feet) 285   PT Equipment Needed at Discharge walker, rolling  (assess need for FWW closer to d/c)   Physical Therapy Time and Intention   Total Session Time (sum of timed and untimed services) 10       Kala Dobbs, PT, DPT  7/29/2025

## 2025-07-29 NOTE — PLAN OF CARE
"Goal Outcome Evaluation:  Patient is alert and oriented and able to make needs known.  Denies pain at rest but will experience left rib pain with certain movements at times. Declined medication intervention.  On Clear liquid diet. Has order to ADAT but patient reports \"bubbly\" feeling followed by loose bloody BM  x2 following clear tray this morning. Will not advance quite yet. MD aware.  Hgb this am 9.0. Recheck tomorrow am.  Telemetry discontined.  On K, Mg and Phos protocols. No replacement needed. Rechecks in am.  Fall band placed and using bed alarm for patient safety as he had a fall this hospitalization. Patient does call appropriately and is steady on feet. SBA.                              "

## 2025-07-29 NOTE — PLAN OF CARE
Goal Outcome Evaluation: Patient is A/Ox4 up with SBA, placed urinal at bedside and hat in toilet to monitor I/O's, voiding adequately, x1 small dark red BM overnight, passing gas, normoactive BS. Patient is tolerating clear liq diet, denies N/V, could advance diet as tolerated. Insulin changed to TID w/ meals. L PIV SL. Patient on K, mg, phos protocols and Hgb recheck in AM. Pt on tele, had an episode of Vtach, denies chest pain.      Plan of Care Reviewed With: patient      Problem: Adult Inpatient Plan of Care  Goal: Plan of Care Review  Description: The Plan of Care Review/Shift note should be completed every shift.  The Outcome Evaluation is a brief statement about your assessment that the patient is improving, declining, or no change.  This information will be displayed automatically on your shift  note.  Outcome: Progressing  Flowsheets (Taken 7/29/2025 0409)  Plan of Care Reviewed With: patient     Problem: Adult Inpatient Plan of Care  Goal: Absence of Hospital-Acquired Illness or Injury  Intervention: Identify and Manage Fall Risk  Recent Flowsheet Documentation  Taken 7/28/2025 2100 by Sarina Gusman RN  Safety Promotion/Fall Prevention:   activity supervised   assistive device/personal items within reach   clutter free environment maintained   lighting adjusted   nonskid shoes/slippers when out of bed   patient and family education   room near nurse's station   room organization consistent   safety round/check completed   supervised activity   Bed alarm on, SBA assist, education on importance to ask for assistance when ambulating. Hx of falls.     Problem: Delirium  Goal: Improved Sleep  Outcome: Progressing  Intervention: Promote Sleep  Recent Flowsheet Documentation  Taken 7/28/2025 2100 by Sarina Gusman RN  Sleep/Rest Enhancement: awakenings minimized     Cares clustered to promote sleep.    Problem: Gastrointestinal Bleeding  Goal: Hemostasis  Intervention: Manage Gastrointestinal  Bleeding  Recent Flowsheet Documentation  Taken 7/28/2025 2100 by Sarina Gusman, RN  Environmental Support: calm environment promoted   Monitored for bloody stools, x1 small dark red BM overnight. PT stated previous stools are dark red. Monitored Hgb went from 8.8 to 9.1. Denies symptoms of dizziness/lightheadedness.     Problem: Pain Acute  Goal: Optimal Pain Control and Function  Outcome: Progressing  Intervention: Optimize Psychosocial Wellbeing  Recent Flowsheet Documentation  Taken 7/28/2025 2100 by Sarina Gusman RN  Supportive Measures: active listening utilized  Intervention: Prevent or Manage Pain  Recent Flowsheet Documentation  Taken 7/28/2025 2100 by Sarina Gusman, RN  Sleep/Rest Enhancement: awakenings minimized  Medication Review/Management: medications reviewed      Denies pain, no PRN's given      Sarina Gusman, MEAGAN

## 2025-07-29 NOTE — PROGRESS NOTES
07/29/25 0900   Appointment Info   Signing Clinician's Name / Credentials (OT) Krystal E leela LEWIS/L   Living Environment   People in Home spouse   Current Living Arrangements house   Home Accessibility stairs to enter home;stairs within home   Number of Stairs, Main Entrance 2   Number of Stairs, Within Home, Primary greater than 10 stairs   Transportation Anticipated family or friend will provide   Living Environment Comments Pt has tub shower.  Pt does not drive, spouse drives.   Self-Care   Current Activity Tolerance moderate   Equipment Currently Used at Home grab bar, tub/shower   Fall history within last six months no   Activity/Exercise/Self-Care Comment Pt is normally independent with basic ADLs   General Information   Onset of Illness/Injury or Date of Surgery 07/24/25   Referring Physician Cornelius   Patient/Family Therapy Goal Statement (OT) home   Additional Occupational Profile Info/Pertinent History of Current Problem Pt admitted with rectal bleed, anemia, syncope and collapse with L rib fx   Existing Precautions/Restrictions fall   Cognitive Status Examination   Cognitive Status Comments Pt is able to give basic history but appears to have short term memory deficit.  Pt forgetful of what  therapist has seaid.   Visual Perception   Visual Impairment/Limitations WFL   Sensory   Sensory Comments UE's intact   Range of Motion Comprehensive   Comment, General Range of Motion WFLs for ADLs   Strength Comprehensive (MMT)   Comment, General Manual Muscle Testing (MMT) Assessment WFLs for ADLs   Coordination   Upper Extremity Coordination No deficits were identified   Bed Mobility   Comment (Bed Mobility) SBA   Transfers   Transfer Comments SBA without AD   Balance   Balance Comments SBA   Activities of Daily Living   BADL Assessment/Intervention lower body dressing   Lower Body Dressing Assessment/Training   Comment, (Lower Body Dressing) SBA   Clinical Impression   Criteria for Skilled  Therapeutic Interventions Met (OT) Yes, treatment indicated   OT Diagnosis Iparied ADL independence   OT Problem List-Impairments impacting ADL balance;cognition;pain   Assessment of Occupational Performance 1-3 Performance Deficits   Planned Therapy Interventions (OT) ADL retraining;bed mobility training;cognition;transfer training   Clinical Decision Making Complexity (OT) problem focused assessment/low complexity   Risk & Benefits of therapy have been explained evaluation/treatment results reviewed;participants included;patient   Clinical Impression Comments Pt seen bedisde for OT eval and treatment.  Pt demonstrates decreased independence with ADLs and mobility due to pain as well as impaired cognition.   OT Total Evaluation Time   OT Eval, Low Complexity Minutes (85379) 10   OT Goals   Therapy Frequency (OT) Daily   OT Predicted Duration/Target Date for Goal Attainment 08/05/25   OT Goals Lower Body Dressing;Bed Mobility;Transfers;OT Goal 1   OT: Lower Body Dressing Supervision/stand-by assist   OT: Bed Mobility Supervision/stand-by assist   OT: Transfer Supervision/stand-by assist   OT: Goal 1 Pt will participate in formal cognitive assessment as needed to A with discharge planning   OT Discharge Planning   OT Plan review bed mobiity, cog, tub transfer   OT Discharge Recommendation (DC Rec) home with assist   OT Rationale for DC Rec Recommend home wiht assist.  Pt states wife is able to assist as needed.   OT Brief overview of current status SBA

## 2025-07-29 NOTE — PROGRESS NOTES
[facility]    Medicine Progress Note - Hospitalist Service    Date of Admission:  7/24/2025    Assessment & Plan   Juan Luis Mims is a 70 year old male admitted on 7/24/2025. He came to the ED for evaluation of rectal bleed.    Acute lower GI bleed, diverticular bleed- continues to have bloody stool  - Mesenteric CT on admission and repeated on 7/25 without active bleeding.  - Colonoscopy 7/26 showed extensive diverticulosis of transverse, descending and sigmoid colon's, without active bleeding, although colon prep was not optimal.  - D/W patient clinical presentation dynamics of diverticular bleed, and with recurrences may need surgical treatment.  - Additional GI bleeding morning of 7/27 requiring massive transfusion, underwent mesenteric angiogram with embolization of the hepatic flexure branches 7/27  - GI consulted, have signed off  - Advancing diet as tolerated    Acute blood loss anemia-now stable in the 9s  - baseline hemoglobin 12,  - S/p total 3 units PRBCs 7/26, massive transfusion 7/27  - Start monitoring every 24 hours  - Continue Protonix 40 mg daily    Generalized weakness in the setting of acute blood loss  - PT and OT consulted  - Case management consulted will likely need TCU vs. Home PT/OT  at discharge    Syncope and collapse on 7/25, likely due to orthostatic hypotension/from GI bleed.  Left eighth rib fracture, from the fall  - CTA 7/25: Minimally displaced acute appearing left lateral eighth rib fracture  - No other traumatic injuries.  - Pain management  - I-S    COPD without exacerbation  - Recent diagnosis, lifetime non-smoker  - Continue PTA Advair, fluticasone nasal spray, fexofenadine  - DuoNebs as needed    Hypomagnesemia  Hypokalemia  -Will monitor and replace with RN replacement protocol        Diet: Advance Diet as Tolerated: Clear Liquid Diet; Regular Diet Adult; Moderate Consistent Carb (60 g CHO per Meal) Diet; Low Lactose Diet    DVT Prophylaxis: Pneumatic  "Compression Devices  Mars Catheter: Not present  Lines: None     Cardiac Monitoring: None  Code Status: Full Code      Clinically Significant Risk Factors        # Hypokalemia: Lowest K = 3.1 mmol/L in last 2 days, will replace as needed   # Hyperchloremia: Highest Cl = 108 mmol/L in last 2 days, will monitor as appropriate      # Hypocalcemia: Lowest iCa = 3.3 mg/dL in last 2 days, will monitor and replace as appropriate   # Hypomagnesemia: Lowest Mg = 1.5 mg/dL in last 2 days, will replace as needed   # Hypoalbuminemia: Lowest albumin = 2.7 g/dL at 7/27/2025  5:54 AM, will monitor as appropriate   # Thrombocytopenia: Lowest platelets = 98 in last 2 days, will monitor for bleeding              # Overweight: Estimated body mass index is 29.7 kg/m  as calculated from the following:    Height as of this encounter: 1.702 m (5' 7\").    Weight as of this encounter: 86 kg (189 lb 9.6 oz)., PRESENT ON ADMISSION     # Financial/Environmental Concerns: none         Social Drivers of Health            Disposition Plan     Medically Ready for Discharge: Anticipated Tomorrow             Brenna Shields MD  Hospitalist Service  [facility]  Securely message with [email or web address] (more info)  Text page via [email or web address] Paging/Directory   ______________________________________________________________________    Interval History   Doing well today continues to have dark red blood in bowel movements.  No bright red blood noted.  Hemoglobin stable at 9.  Having increased bowel sounds.  Also having generalized weakness.  Having difficulty sitting at the edge of the bed.  Denies acute complaints.  Anticipate will be medically ready for discharge tomorrow.    Physical Exam   Vital Signs: Temp: 98.7  F (37.1  C) Temp src: Oral BP: 115/61 Pulse: 82   Resp: 18 SpO2: 95 % O2 Device: None (Room air)    Weight: 189 lbs 9.6 oz    General Appearance: Awake, alert, in no acute distress  Respiratory: CTAB, no wheeze  Cardiovascular: " RRR, no murmur noted  GI: soft, nontender, non distended, increased bowel sounds  Skin: no jaundice, no rash      Medical Decision Making       45 MINUTES SPENT BY ME on the date of service doing chart review, history, exam, documentation & further activities per the note.      Data     I have personally reviewed the following data over the past 24 hrs:    6.8  \   9.0 (L)   / 139 (L)     138 103 10.9 /  115 (H)   3.6 28 0.99 \       Imaging results reviewed over the past 24 hrs:   No results found for this or any previous visit (from the past 24 hours).

## 2025-07-29 NOTE — PLAN OF CARE
Problem: Adult Inpatient Plan of Care  Goal: Optimal Comfort and Wellbeing  Outcome: Progressing  Intervention: Provide Person-Centered Care  Recent Flowsheet Documentation  Taken 7/28/2025 1630 by Gloria Dobbs RN  Trust Relationship/Rapport:   care explained   choices provided     Problem: Delirium  Goal: Optimal Coping  Outcome: Progressing     Problem: Gastrointestinal Bleeding  Goal: Hemostasis  Outcome: Progressing     Problem: Pain Acute  Goal: Optimal Pain Control and Function  Outcome: Progressing  Intervention: Optimize Psychosocial Wellbeing  Recent Flowsheet Documentation  Taken 7/28/2025 1630 by Gloria Dobbs RN  Diversional Activities: television  Intervention: Prevent or Manage Pain  Recent Flowsheet Documentation  Taken 7/28/2025 1630 by Gloria Dobbs RN  Medication Review/Management: medications reviewed     Problem: Risk for Delirium  Goal: Optimal Coping  Outcome: Progressing   Denies pain/nausea. Up to bathroom SBA. Pt continues to have a few loose/bloody stools. Denies any dizziness or sob with activity. VSS on RA, continue to monitor.    Gloria Dobbs RN

## 2025-07-30 ENCOUNTER — APPOINTMENT (OUTPATIENT)
Dept: PHYSICAL THERAPY | Facility: HOSPITAL | Age: 70
DRG: 356 | End: 2025-07-30
Payer: MEDICARE

## 2025-07-30 ENCOUNTER — APPOINTMENT (OUTPATIENT)
Dept: OCCUPATIONAL THERAPY | Facility: HOSPITAL | Age: 70
DRG: 356 | End: 2025-07-30
Payer: MEDICARE

## 2025-07-30 LAB
GLUCOSE BLDC GLUCOMTR-MCNC: 120 MG/DL (ref 70–99)
GLUCOSE BLDC GLUCOMTR-MCNC: 147 MG/DL (ref 70–99)
GLUCOSE BLDC GLUCOMTR-MCNC: 97 MG/DL (ref 70–99)
HGB BLD-MCNC: 9.3 G/DL (ref 13.3–17.7)
MAGNESIUM SERPL-MCNC: 2.1 MG/DL (ref 1.7–2.3)
MCV RBC AUTO: 87 FL (ref 78–100)
PHOSPHATE SERPL-MCNC: 4 MG/DL (ref 2.5–4.5)
POTASSIUM SERPL-SCNC: 3.9 MMOL/L (ref 3.4–5.3)

## 2025-07-30 PROCEDURE — 97535 SELF CARE MNGMENT TRAINING: CPT | Mod: GO

## 2025-07-30 PROCEDURE — 97116 GAIT TRAINING THERAPY: CPT | Mod: GP

## 2025-07-30 PROCEDURE — 97110 THERAPEUTIC EXERCISES: CPT | Mod: GP

## 2025-07-30 PROCEDURE — 84100 ASSAY OF PHOSPHORUS: CPT | Performed by: INTERNAL MEDICINE

## 2025-07-30 PROCEDURE — 84132 ASSAY OF SERUM POTASSIUM: CPT | Performed by: INTERNAL MEDICINE

## 2025-07-30 PROCEDURE — 250N000013 HC RX MED GY IP 250 OP 250 PS 637: Performed by: INTERNAL MEDICINE

## 2025-07-30 PROCEDURE — 85018 HEMOGLOBIN: CPT | Performed by: INTERNAL MEDICINE

## 2025-07-30 PROCEDURE — 83735 ASSAY OF MAGNESIUM: CPT | Performed by: INTERNAL MEDICINE

## 2025-07-30 PROCEDURE — 250N000012 HC RX MED GY IP 250 OP 636 PS 637: Performed by: INTERNAL MEDICINE

## 2025-07-30 PROCEDURE — 99233 SBSQ HOSP IP/OBS HIGH 50: CPT | Performed by: INTERNAL MEDICINE

## 2025-07-30 PROCEDURE — 120N000001 HC R&B MED SURG/OB

## 2025-07-30 PROCEDURE — 36415 COLL VENOUS BLD VENIPUNCTURE: CPT | Performed by: INTERNAL MEDICINE

## 2025-07-30 RX ORDER — SIMETHICONE 40MG/0.6ML
40 SUSPENSION, DROPS(FINAL DOSAGE FORM)(ML) ORAL 4 TIMES DAILY
Status: DISCONTINUED | OUTPATIENT
Start: 2025-07-30 | End: 2025-07-31 | Stop reason: HOSPADM

## 2025-07-30 RX ADMIN — FLUTICASONE PROPIONATE 1 SPRAY: 50 SPRAY, METERED NASAL at 08:59

## 2025-07-30 RX ADMIN — FLUTICASONE FUROATE AND VILANTEROL TRIFENATATE 1 PUFF: 100; 25 POWDER RESPIRATORY (INHALATION) at 08:59

## 2025-07-30 RX ADMIN — PANTOPRAZOLE SODIUM 40 MG: 40 TABLET, DELAYED RELEASE ORAL at 08:59

## 2025-07-30 RX ADMIN — SIMETHICONE 40 MG: 20 SUSPENSION/ DROPS ORAL at 21:20

## 2025-07-30 RX ADMIN — SIMETHICONE 40 MG: 20 SUSPENSION/ DROPS ORAL at 17:56

## 2025-07-30 RX ADMIN — LATANOPROST 1 DROP: 50 SOLUTION/ DROPS OPHTHALMIC at 21:19

## 2025-07-30 RX ADMIN — INSULIN ASPART 1 UNITS: 100 INJECTION, SOLUTION INTRAVENOUS; SUBCUTANEOUS at 10:42

## 2025-07-30 RX ADMIN — FEXOFENADINE HYDROCHLORIDE 180 MG: 60 TABLET ORAL at 08:59

## 2025-07-30 RX ADMIN — SIMETHICONE 40 MG: 20 SUSPENSION/ DROPS ORAL at 14:33

## 2025-07-30 ASSESSMENT — ACTIVITIES OF DAILY LIVING (ADL)
ADLS_ACUITY_SCORE: 38

## 2025-07-30 NOTE — PLAN OF CARE
Occupational Therapy Discharge Summary    Reason for therapy discharge:    All goals and outcomes met, no further needs identified.    Progress towards therapy goal(s). See goals on Care Plan in [facility] electronic health record for goal details.  Goals met    Therapy recommendation(s):    Home with family support

## 2025-07-30 NOTE — PLAN OF CARE
Problem: Adult Inpatient Plan of Care  Goal: Plan of Care Review  Description: The Plan of Care Review/Shift note should be completed every shift.  The Outcome Evaluation is a brief statement about your assessment that the patient is improving, declining, or no change.  This information will be displayed automatically on your shift  note.  Outcome: Progressing     Problem: Adult Inpatient Plan of Care  Goal: Optimal Comfort and Wellbeing  Outcome: Progressing  Pt complained of cramping and a feeling of tension on right side of abdomen  after having BM.  Notified hospitalist who ordered simethicone 4x/day.  Intervention: Provide Person-Centered Care  Recent Flowsheet Documentation  Taken 7/30/2025 1140 by Shamika Ryan, RN  Trust Relationship/Rapport: care explained     Problem: Gastrointestinal Bleeding  Goal: Hemostasis  Intervention: Manage Gastrointestinal Bleeding  Recent Flowsheet Documentation  Taken 7/30/2025 1140 by Shamika Ryan, RN  Environmental Support: calm environment promoted   Goal Outcome Evaluation:  No blood in stools.  Just black loose stools x2.  Pt steady on feet.  Tolerated full liquid diet.  Mechanical soft ordered for dinner.

## 2025-07-30 NOTE — PLAN OF CARE
Problem: Adult Inpatient Plan of Care  Goal: Optimal Comfort and Wellbeing  Outcome: Progressing     Problem: Gastrointestinal Bleeding  Goal: Optimal Coping with Acute Illness  Outcome: Progressing     Problem: Pain Acute  Goal: Optimal Pain Control and Function  Outcome: Progressing   Goal Outcome Evaluation:         Pt is A/O x4, denies pain. No BM/bleeding noted on this shift. On clear liquid diet and lactose intolerant. VSS, continue to monitor. Dunia Jenkins RN

## 2025-07-30 NOTE — PROGRESS NOTES
[facility]    Medicine Progress Note - Hospitalist Service    Date of Admission:  7/24/2025    Assessment & Plan   Juan Luis Mims is a 70 year old male admitted on 7/24/2025. He came to the ED for evaluation of rectal bleed.    Acute lower GI bleed, diverticular bleed-resolved  - Mesenteric CT on admission and repeated on 7/25 without active bleeding.  - Colonoscopy 7/26 showed extensive diverticulosis of transverse, descending and sigmoid colon's, without active bleeding, although colon prep was not optimal.  - D/W patient clinical presentation dynamics of diverticular bleed, and with recurrences may need surgical treatment.  - Additional GI bleeding morning of 7/27 requiring massive transfusion, underwent mesenteric angiogram with embolization of the hepatic flexure branches 7/27  - GI consulted, have signed off  - Tolerating soft diet    Acute blood loss anemia-stable in the nines  - baseline hemoglobin 12,  - S/p total 3 units PRBCs 7/26, massive transfusion 7/27  - Start monitoring every 24 hours  - Continue Protonix 40 mg daily    Generalized weakness in the setting of acute blood loss  - PT and OT consulted  - Case management consulted: Planning for home with PT, OT,    Syncope and collapse on 7/25, likely due to orthostatic hypotension/from GI bleed.  Left eighth rib fracture, from the fall  - CTA 7/25: Minimally displaced acute appearing left lateral eighth rib fracture  - No other traumatic injuries.  - Pain management  - I-S    COPD without exacerbation  - Recent diagnosis, lifetime non-smoker  - Continue PTA Advair, fluticasone nasal spray, fexofenadine  - DuoNebs as needed    Hypomagnesemia  Hypokalemia  -Will monitor and replace with RN replacement protocol          Diet: Mechanical/Dental Soft Diet    DVT Prophylaxis: Pneumatic Compression Devices  Mars Catheter: Not present  Lines: None     Cardiac Monitoring: None  Code Status: Full Code      Clinically Significant Risk  "Factors               # Hypoalbuminemia: Lowest albumin = 2.7 g/dL at 7/27/2025  5:54 AM, will monitor as appropriate                # Overweight: Estimated body mass index is 29.7 kg/m  as calculated from the following:    Height as of this encounter: 1.702 m (5' 7\").    Weight as of this encounter: 86 kg (189 lb 9.6 oz).      # Financial/Environmental Concerns: none         Social Drivers of Health            Disposition Plan     Medically Ready for Discharge: Anticipated Tomorrow             Brenna Shields MD  Hospitalist Service  [facility]  Securely message with [employer] (more info)  Text page via [email or web address] Paging/Directory   ______________________________________________________________________    Interval History   Doing well.  No acute complaints however continues to have generalized weakness and lightheadedness with standing.  No longer blood in stool.  Stools appear darker in color.  Had long discussion over the phone with his wife this afternoon.  Reviewed clinical findings and past couple of days.  Reviewed plans for discharge including home health care with PT and OT.  Reviewed plans for hemoglobin testing outpatient and follow-up with PCP.  Will likely discharge in the morning tomorrow.    Physical Exam   Vital Signs: Temp: 98.7  F (37.1  C) Temp src: Oral BP: 127/61 Pulse: 99   Resp: 16 SpO2: 96 % O2 Device: None (Room air)    Weight: 189 lbs 9.6 oz    General Appearance: Awake, alert, in no acute distress  Respiratory: CTAB, no wheeze  Cardiovascular: RRR, no murmur noted  GI: soft, nontender, non distended, normal bowel sounds  Skin: no jaundice, no rash    Medical Decision Making       50 MINUTES SPENT BY ME on the date of service doing chart review, history, exam, documentation & further activities per the note.      Data     I have personally reviewed the following data over the past 24 hrs:    N/A  \   9.3 (L)   / N/A     N/A N/A N/A /  97   3.9 N/A N/A \       Imaging " results reviewed over the past 24 hrs:   No results found for this or any previous visit (from the past 24 hours).

## 2025-07-30 NOTE — PROGRESS NOTES
POC:  Pt is A&Ox4; pt states he has no complaints of pain; pt is on clear diet that has advanced, however, pt states his stomach is upset and wants to avoid food for now; is lactose intolerant; pt does not need protocol replacements; pt is able to ambulate to bathroom by self; BS checks have been under 140; pt rested most of evening; voiced he would like to go home.    Krystal Petersen RN

## 2025-07-31 ENCOUNTER — APPOINTMENT (OUTPATIENT)
Dept: PHYSICAL THERAPY | Facility: HOSPITAL | Age: 70
DRG: 356 | End: 2025-07-31
Payer: MEDICARE

## 2025-07-31 VITALS
HEART RATE: 85 BPM | SYSTOLIC BLOOD PRESSURE: 135 MMHG | DIASTOLIC BLOOD PRESSURE: 66 MMHG | BODY MASS INDEX: 29.76 KG/M2 | WEIGHT: 189.6 LBS | HEIGHT: 67 IN | RESPIRATION RATE: 22 BRPM | TEMPERATURE: 98.9 F | OXYGEN SATURATION: 97 %

## 2025-07-31 PROBLEM — K92.2 GI BLEED: Status: RESOLVED | Noted: 2025-07-25 | Resolved: 2025-07-31

## 2025-07-31 PROBLEM — K57.31 DIVERTICULAR HEMORRHAGE: Status: RESOLVED | Noted: 2025-07-26 | Resolved: 2025-07-31

## 2025-07-31 PROBLEM — K62.5 BRIGHT RED RECTAL BLEEDING: Status: RESOLVED | Noted: 2025-07-25 | Resolved: 2025-07-31

## 2025-07-31 LAB
ANION GAP SERPL CALCULATED.3IONS-SCNC: 9 MMOL/L (ref 7–15)
BUN SERPL-MCNC: 9.1 MG/DL (ref 8–23)
CALCIUM SERPL-MCNC: 8.7 MG/DL (ref 8.8–10.4)
CHLORIDE SERPL-SCNC: 103 MMOL/L (ref 98–107)
CREAT SERPL-MCNC: 1.1 MG/DL (ref 0.67–1.17)
EGFRCR SERPLBLD CKD-EPI 2021: 73 ML/MIN/1.73M2
ERYTHROCYTE [DISTWIDTH] IN BLOOD BY AUTOMATED COUNT: 15.9 % (ref 10–15)
GLUCOSE SERPL-MCNC: 118 MG/DL (ref 70–99)
HCO3 SERPL-SCNC: 26 MMOL/L (ref 22–29)
HCT VFR BLD AUTO: 27.8 % (ref 40–53)
HGB BLD-MCNC: 9.4 G/DL (ref 13.3–17.7)
MAGNESIUM SERPL-MCNC: 2.1 MG/DL (ref 1.7–2.3)
MCH RBC QN AUTO: 29.7 PG (ref 26.5–33)
MCHC RBC AUTO-ENTMCNC: 33.8 G/DL (ref 31.5–36.5)
MCV RBC AUTO: 88 FL (ref 78–100)
PHOSPHATE SERPL-MCNC: 3.9 MG/DL (ref 2.5–4.5)
PLATELET # BLD AUTO: 220 10E3/UL (ref 150–450)
POTASSIUM SERPL-SCNC: 3.7 MMOL/L (ref 3.4–5.3)
RBC # BLD AUTO: 3.16 10E6/UL (ref 4.4–5.9)
SODIUM SERPL-SCNC: 138 MMOL/L (ref 135–145)
WBC # BLD AUTO: 5.2 10E3/UL (ref 4–11)

## 2025-07-31 PROCEDURE — 84100 ASSAY OF PHOSPHORUS: CPT | Performed by: INTERNAL MEDICINE

## 2025-07-31 PROCEDURE — 97530 THERAPEUTIC ACTIVITIES: CPT | Mod: GP

## 2025-07-31 PROCEDURE — 83735 ASSAY OF MAGNESIUM: CPT | Performed by: INTERNAL MEDICINE

## 2025-07-31 PROCEDURE — 250N000013 HC RX MED GY IP 250 OP 250 PS 637: Performed by: INTERNAL MEDICINE

## 2025-07-31 PROCEDURE — 36415 COLL VENOUS BLD VENIPUNCTURE: CPT | Performed by: INTERNAL MEDICINE

## 2025-07-31 PROCEDURE — 97116 GAIT TRAINING THERAPY: CPT | Mod: GP

## 2025-07-31 PROCEDURE — 80048 BASIC METABOLIC PNL TOTAL CA: CPT | Performed by: INTERNAL MEDICINE

## 2025-07-31 PROCEDURE — 85027 COMPLETE CBC AUTOMATED: CPT | Performed by: INTERNAL MEDICINE

## 2025-07-31 PROCEDURE — 99239 HOSP IP/OBS DSCHRG MGMT >30: CPT | Performed by: INTERNAL MEDICINE

## 2025-07-31 RX ORDER — PANTOPRAZOLE SODIUM 40 MG/1
40 TABLET, DELAYED RELEASE ORAL
Qty: 30 TABLET | Refills: 0 | Status: SHIPPED | OUTPATIENT
Start: 2025-08-01 | End: 2025-08-31

## 2025-07-31 RX ORDER — POTASSIUM CHLORIDE 1500 MG/1
20 TABLET, EXTENDED RELEASE ORAL ONCE
Status: COMPLETED | OUTPATIENT
Start: 2025-07-31 | End: 2025-07-31

## 2025-07-31 RX ADMIN — FEXOFENADINE HYDROCHLORIDE 180 MG: 60 TABLET ORAL at 07:57

## 2025-07-31 RX ADMIN — POTASSIUM CHLORIDE 20 MEQ: 1500 TABLET, EXTENDED RELEASE ORAL at 07:57

## 2025-07-31 RX ADMIN — FLUTICASONE PROPIONATE 1 SPRAY: 50 SPRAY, METERED NASAL at 07:58

## 2025-07-31 RX ADMIN — FLUTICASONE FUROATE AND VILANTEROL TRIFENATATE 1 PUFF: 100; 25 POWDER RESPIRATORY (INHALATION) at 07:57

## 2025-07-31 RX ADMIN — SIMETHICONE 40 MG: 20 SUSPENSION/ DROPS ORAL at 08:01

## 2025-07-31 RX ADMIN — PANTOPRAZOLE SODIUM 40 MG: 40 TABLET, DELAYED RELEASE ORAL at 06:21

## 2025-07-31 ASSESSMENT — ACTIVITIES OF DAILY LIVING (ADL)
ADLS_ACUITY_SCORE: 38

## 2025-07-31 NOTE — PLAN OF CARE
Goal Outcome Evaluation:       Pt continues to be without pain. No further signs bleeding. Daily hgb check. Today 9.3. Loose stools, no longer maroon/dark. Tolerating mechanical soft diet. Ambulating well without issue. Anticipating home with home care tomorrow. Details pending per care management/SW.     Niesha Riley RN-BC    Problem: Anemia  Goal: Anemia Symptom Improvement  Outcome: Progressing     Problem: Gastrointestinal Bleeding  Goal: Optimal Coping with Acute Illness  Outcome: Adequate for Care Transition  Goal: Hemostasis  Outcome: Progressing

## 2025-07-31 NOTE — PROGRESS NOTES
Physical Therapy Discharge Summary    Reason for therapy discharge:    Discharged to home with home therapy.    Progress towards therapy goal(s). See goals on Care Plan in [facility] electronic health record for goal details.  Goals met.    Therapy recommendation(s):    Continued therapy is recommended.  Rationale/Recommendations:  Home PT.

## 2025-07-31 NOTE — PLAN OF CARE
Goal Outcome Evaluation:      Plan of Care Reviewed With: patient    Overall Patient Progress: improvingOverall Patient Progress: improving                Subjective:       Patient ID: Joyce Peterson is a 59 y.o. female.    Chief Complaint: Follow-up (gastroparesis)    HPI  Review of Systems   Constitutional: Positive for fatigue. Negative for activity change, appetite change, chills, diaphoresis, fever and unexpected weight change.   HENT: Negative for congestion, ear pain, mouth sores, nosebleeds, postnasal drip, rhinorrhea, sinus pressure, sore throat, trouble swallowing and voice change.    Eyes: Negative for pain.   Respiratory: Negative for cough, shortness of breath and wheezing.    Cardiovascular: Negative for chest pain, palpitations and leg swelling.   Genitourinary: Negative for difficulty urinating, dysuria, flank pain, hematuria and menstrual problem.   Musculoskeletal: Positive for arthralgias. Negative for back pain, gait problem, joint swelling, myalgias and neck pain.   Skin: Negative for rash.   Neurological: Negative for dizziness, tremors, syncope, numbness and headaches.   Hematological: Negative for adenopathy. Does not bruise/bleed easily.   Psychiatric/Behavioral: Negative for agitation, behavioral problems, confusion, decreased concentration and dysphoric mood. The patient is not nervous/anxious.        Objective:      Physical Exam   Constitutional: She is oriented to person, place, and time. She appears well-developed and well-nourished. No distress.   HENT:   Head: Normocephalic and atraumatic.   Right Ear: External ear normal.   Left Ear: External ear normal.   Nose: Nose normal.   Mouth/Throat: Oropharynx is clear and moist. No oropharyngeal exudate.   Eyes: Conjunctivae are normal. Pupils are equal, round, and reactive to light. No scleral icterus.   Neck: Normal range of motion. Neck supple. No thyromegaly present.   Cardiovascular: Normal rate, regular rhythm, normal heart sounds and intact distal pulses.  Exam reveals no gallop.    No murmur heard.  Pulmonary/Chest: Effort normal and breath sounds normal. She has no wheezes. She  has no rales.   Abdominal: Soft. Bowel sounds are normal. She exhibits no distension and no mass. There is no tenderness. There is no rebound and no guarding. No hernia.   Musculoskeletal: Normal range of motion. She exhibits no edema or tenderness.   Lymphadenopathy:     She has no cervical adenopathy.   Neurological: She is alert and oriented to person, place, and time. No cranial nerve deficit.   Skin: Skin is warm and dry. No rash noted.   Psychiatric: She has a normal mood and affect. Her behavior is normal. Judgment and thought content normal.       Assessment:       1. Gastroparesis    2. Research study patient    3. Iron deficiency anemia due to chronic blood loss        Plan:         HISTORY OF PRESENT ILLNESS:  This is a followup for the cisapride compassionate   trial for gastroparesis.  From a gastroparesis standpoint, she is doing well.    She continues with mild fullness after she eats and nausea.  There are no   episodes of vomiting.  No severe abdominal pain.  Reflux symptoms are under   control.  Bowel movements are actually better.  She feels very good about her   use of cisapride, feels like it has definitely helped her and feels like she   definitely wants to continue being on the medicine.    Since her last visit, she did travel to [place].  She had a very severe   gastroenteritis.  It lasted about 5 days.  She actually had to seek emergency   care and get IV fluids.  They discharged her on antibiotics, antidiarrheal   agents and anti-spasm medicine.  She was slow to recover, but finally she feels   back to normal from that regard.    REVIEW OF SYSTEMS:  Her main problem is fatigue and her baseline arthritis.    ASSESSMENT AND PLAN:  1.  Gastroparesis.  She wants to continue on the cisapride study.  The forms   were filled out today.  I will check her EKG and labs to make sure she has no   problems or contraindications.  2.  Research study patient.  Considerable time spent filling out forms and    discussing with the research coordinator.  3.  Iron deficiency anemia.  This is iron deficiency anemia.  I did an EGD and   colon, which showed no sign of a lesion that could cause bleeding.  I think it   is time to do a video capsule.  Video capsule is going to be difficult.  She has   some esophageal dysmotility, although I think it will go down.  There is no   structural blockage of the esophagus.  It might be delayed emptying through the   stomach, which would impede the full capability of the study, but I think as   long as she takes her cisapride before taking the capsule then I think it will   be okay.  At least, we will get some information and right now, we do not have   any information about her small bowel.  It is a very difficult issue.    A 40-minute appointment, greater than half time in face-to-face counseling.    Considerable time filling out forms.      ANDRES/IN  dd: 11/13/2017 15:36:00 (CST)  td: 11/14/2017 07:52:00 (CST)  Doc ID   #[id number]  Job ID #[id number]    CC:

## 2025-07-31 NOTE — PLAN OF CARE
Goal Outcome Evaluation:    Problem: Adult Inpatient Plan of Care  Goal: Absence of Hospital-Acquired Illness or Injury  Intervention: Identify and Manage Fall Risk  Recent Flowsheet Documentation  Taken 7/30/2025 2329 by Nuris Islas, RN  Safety Promotion/Fall Prevention: activity supervised     Problem: Adult Inpatient Plan of Care  Goal: Optimal Comfort and Wellbeing  Intervention: Provide Person-Centered Care  Recent Flowsheet Documentation  Taken 7/31/2025 0000 by Nuris Islas RN  Trust Relationship/Rapport:   care explained   thoughts/feelings acknowledged     Problem: Gastrointestinal Bleeding  Goal: Hemostasis  Outcome: Progressing     Mechanical/Soft diet.  Mg, K, and Phosph protocols. Labs drawn this am.  Standby assist to the bathroom. Will call/let someone know when he has to go.  Anticipated for discharge today.

## 2025-07-31 NOTE — CONSULTS
Care Management Discharge Note    Discharge Date: 07/31/2025       Discharge Disposition: Home Care    Discharge Services: Home Care    Discharge DME: Walker (issued by PT)    Discharge Transportation: family or friend will provide    Private pay costs discussed: Not applicable    Does the patient's insurance plan have a 3 day qualifying hospital stay waiver?  No    PAS Confirmation Code: NA  Patient/family educated on Medicare website which has current facility and service quality ratings: yes    Education Provided on the Discharge Plan: Yes  Persons Notified of Discharge Plans: patient, home care  Patient/Family in Agreement with the Plan: yes    Handoff Referral Completed: No, handoff not indicated or clinically appropriate    Additional Information:  Recommendation is home with home care PT.  CM met with patient, patient accepting of home care.    CM placed referral to [facility] for home care PT.    Anticipated discharge today.    Home care requested RN be added for symptom monitoring and management.   CM sent request to provider, provider added.  CM sent orders to [facility].    Lilo Eastman RN

## 2025-08-03 NOTE — DISCHARGE SUMMARY
"[facility]  Hospitalist Discharge Summary      Date of Admission:  7/24/2025  Date of Discharge:  7/31/2025  2:50 PM  Discharging Provider: Brenna Shields MD  Discharge Service: Hospitalist Service    Discharge Diagnoses   Acute lower GI bleed due to diverticula  Acute blood loss anemia now stable  Generalized weakness due to acute blood loss  Syncope and collapse due to anemia with orthostatic hypotension  COPD without exacerbation  Hypomagnesemia  Hypokalemia    Clinically Significant Risk Factors     # Overweight: Estimated body mass index is 29.7 kg/m  as calculated from the following:    Height as of this encounter: 1.702 m (5' 7\").    Weight as of this encounter: 86 kg (189 lb 9.6 oz).       Follow-ups Needed After Discharge   Follow-up Appointments       Hospital Follow-up with Existing Primary Care Provider (PCP)      Has appointment on the 8th    Schedule Primary Care visit within: 7 Days   Recommended labs and Imaging (to be ordered by Primary Care Provider): CBC, BMP               Unresulted Labs Ordered in the Past 30 Days of this Admission       Date and Time Order Name Status Description    7/27/2025  1:05 PM Prepare plasma (unit) Preliminary     7/27/2025  1:05 PM Prepare plasma (unit) Preliminary     7/27/2025 12:32 PM Prepare red blood cells (unit) Preliminary     7/27/2025 12:32 PM Prepare red blood cells (unit) Preliminary     7/27/2025 12:30 PM Prepare red blood cells (unit) Preliminary     7/27/2025 12:30 PM Prepare red blood cells (unit) Preliminary     7/27/2025 12:16 PM Prepare plasma (unit) Preliminary     7/27/2025 12:16 PM Prepare plasma (unit) Preliminary     7/27/2025 12:03 PM Prepare pheresed platelets (unit) Preliminary     7/27/2025 11:03 AM Prepare red blood cells (unit) Preliminary     7/27/2025 11:03 AM Prepare red blood cells (unit) Preliminary     7/27/2025 10:28 AM Prepare pheresed platelets (unit) Preliminary     7/27/2025 10:28 AM Prepare pheresed " platelets (unit) Preliminary         These results will be followed up by Brenna Shields    Discharge Disposition   Discharged to home  Condition at discharge: Stable    Hospital Course    Juan Luis Mims is a 70 year old male admitted on 7/24/2025. He came to the ED for evaluation of rectal bleed.  While inpatient he had a syncopal episode likely due to orthostatic hypotension in the setting of acute anemia and fell fracturing a rib.  He had a colonoscopy done 7/26 that showed extensive diverticulosis of the transverse, descending, and sigmoid colon without active bleeding at that time.  He required a total of 3 units PRBCs initially.  The initial bleeding appeared to have stopped.  Then on the morning of 7/27 he had acute significant blood loss requiring a massive transfusion.  He had a CTA done at that time that showed active bleeding and he was taken for angiography by IR.  He underwent mesenteric angiogram with embolization of the hepatic flexure on 7/27.  After this his hemoglobin stabilized.  He was evaluated by PT and OT and will go home with therapy.     Acute lower GI bleed, diverticular bleed-resolved  - Mesenteric CT on admission and repeated on 7/25 without active bleeding.  - Colonoscopy 7/26 showed extensive diverticulosis of transverse, descending and sigmoid colon's, without active bleeding, although colon prep was not optimal.  - Additional GI bleeding morning of 7/27 requiring massive transfusion, underwent mesenteric angiogram with embolization of the hepatic flexure branches 7/27  - GI consulted  - Tolerating soft diet at discharge     Acute blood loss anemia-stable in the nines  - baseline hemoglobin 12  - S/p total 3 units PRBCs 7/26, massive transfusion 7/27  - Continue Protonix 40 mg daily     Generalized weakness in the setting of acute blood loss  - PT and OT consulted  - Case management consulted: Planning for home with PT, OT, RN     Syncope and collapse on 7/25, likely due to  orthostatic hypotension/from GI bleed.  Left eighth rib fracture, from the fall  - CTA 7/25: Minimally displaced acute appearing left lateral eighth rib fracture  - No other traumatic injuries.  - I-S     COPD without exacerbation  - Recent diagnosis, lifetime non-smoker  - Continue PTA Advair, fluticasone nasal spray, fexofenadine     Hypomagnesemia  Hypokalemia  - monitored and replaced with RN replacement protocol    Consultations This Hospital Stay   GASTROENTEROLOGY IP CONSULT  CARE MANAGEMENT / SOCIAL WORK IP CONSULT  VASCULAR ACCESS ADULT IP CONSULT  INTENSIVIST IP CONSULT  INTERVENTIONAL RADIOLOGY ADULT/PEDS IP CONSULT  SOCIAL WORK IP CONSULT  NEUROLOGY IP CONSULT  CARE MANAGEMENT / SOCIAL WORK IP CONSULT  PHYSICAL THERAPY ADULT IP CONSULT  OCCUPATIONAL THERAPY ADULT IP CONSULT  CARE MANAGEMENT / SOCIAL WORK IP CONSULT  CARE MANAGEMENT / SOCIAL WORK IP CONSULT    Code Status   Prior    Time Spent on this Encounter   IBrenna MD, personally saw the patient today and spent greater than 30 minutes discharging this patient.       Brenna Shields MD  [address] [address]  Phone: [phone]  Fax: [phone]  ______________________________________________________________________    Physical Exam   Vital Signs:                    Weight: 189 lbs 9.6 oz  General Appearance: Awake, alert, in no acute distress  Respiratory: CTAB, no wheeze  Cardiovascular: RRR, no murmur noted  GI: soft, nontender, non distended, normal bowel sounds  Skin: no jaundice, no rash         Primary Care Physician   Benoit Su    Discharge Orders      Home Care Referral      Reason for your hospital stay    Acute blood loss anemia due to diverticular bleed     Activity    Your activity upon discharge: activity as tolerated     Walker Order     Diet    Follow this diet upon discharge:  Mechanical/Dental Soft Diet     Hospital Follow-up with Existing  Primary Care Provider (PCP)    Has appointment on the 8th       Significant Results and Procedures   Most Recent 3 CBC's:  Recent Labs   Lab Test 07/31/25  0633 07/30/25  0649 07/29/25  0721 07/28/25  1820 07/28/25  0526   WBC 5.2  --  6.8  --  8.4   HGB 9.4* 9.3* 9.0*   < > 8.8*  8.8*   MCV 88 87 87   < > 86  86     --  139*  --  98*    < > = values in this interval not displayed.     Most Recent 3 BMP's:  Recent Labs   Lab Test 07/31/25  0633 07/30/25  1113 07/30/25  0813 07/30/25  0649 07/29/25  1227 07/29/25  0721 07/28/25  0809 07/28/25  0526     --   --   --   --  138  --  143   POTASSIUM 3.7  --   --  3.9  --  3.6  --  3.8   CHLORIDE 103  --   --   --   --  103  --  107   CO2 26  --   --   --   --  28  --  27   BUN 9.1  --   --   --   --  10.9  --  11.1   CR 1.10  --   --   --   --  0.99  --  0.93   ANIONGAP 9  --   --   --   --  7  --  9   SUZE 8.7*  --   --   --   --  8.2*  --  7.7*   * 97 147*  --    < > 115*   < > 117*    < > = values in this interval not displayed.   ,   Results for orders placed or performed during the hospital encounter of 07/24/25   CTA GI Bleed    Narrative    EXAM: CTA GI BLEED  LOCATION: [facility]  DATE: 7/25/2025    INDICATION: rectal bleeding today  COMPARISON: CT abdomen/pelvis 6/13/2023  TECHNIQUE: CT angiogram abdomen pelvis during arterial phase of injection of IV contrast. 2D and 3D MIP reconstructions were performed by the CT technologist. Dose reduction techniques were used.  CONTRAST: 90ml isovue 370    FINDINGS:  ANGIOGRAM ABDOMEN/PELVIS:   No intraluminal extravasation of contrast in small bowel or large bowel.    Abdominal Aorta: Patent and normal in caliber. Mild aortic calcifications. No evidence of dissection or penetrating ulcer.    Celiac Artery: Patent.  Superior Mesenteric Artery: Patent.  Right Renal Artery: Patent.  Left Renal Artery: Patent.  Inferior Mesenteric Artery: Patent.    Right Common Iliac Artery:  Patent.  Right External Iliac Artery: Patent.  Right Internal Iliac Artery: Patent.  Right Common Femoral Artery: Patent.  Proximal Right Superficial Femoral Artery: Patent.  Proximal Right Deep Femoral Artery: Patent.    Left Common Iliac Artery: Patent.  Left External Iliac Artery: Patent.  Left Internal Iliac Artery: Patent.  Left Common Femoral Artery: Patent.  Proximal Left Superficial Femoral Artery: Patent.  Proximal Left Deep Femoral Artery: Patent.      LOWER CHEST: Unremarkable.    HEPATOBILIARY: Liver appears normal. Multiple tiny gallstones in the gallbladder. No significant gallbladder distention. No biliary dilatation.    PANCREAS: Normal.    SPLEEN: Punctate calcifications in the spleen, compatible with old granulomatous disease.    ADRENAL GLANDS: Normal.    KIDNEYS/BLADDER: Few nonobstructing right renal calculi measuring up to 0.3 cm. No hydronephrosis. Kidneys appear normal. Urinary bladder is mostly decompressed.    BOWEL: No intraluminal extravasation of contrast in small bowel or large bowel. Mild colonic diverticulosis. No evidence of acute diverticulitis. Small bowel appears normal. No bowel obstruction. Normal appendix. No evidence of acute appendicitis.    LYMPH NODES: No lymphadenopathy.    PELVIC ORGANS: Unremarkable.    MUSCULOSKELETAL: Fat-containing left inguinal hernia. Multilevel degenerative changes of the spine.      Impression    IMPRESSION:  1.  No active gastrointestinal bleeding identified.    2.  Mild colonic diverticulosis. No evidence of acute diverticulitis.   CT Head w/o Contrast    Narrative    EXAM: CT HEAD W/O CONTRAST  LOCATION: [facility]  DATE: 07/25/2025    INDICATION: Fall, questionable head impact.  COMPARISON: Brain MRI 07/11/2010.  TECHNIQUE: Routine CT Head without IV contrast. Multiplanar reformats. Dose reduction techniques were used.    FINDINGS:  INTRACRANIAL CONTENTS: Bilateral basal ganglia calcifications. No intracranial  hemorrhage, extra-axial collection, or mass effect. No CT evidence of acute infarct. Normal parenchymal attenuation. Normal ventricles and sulci.     VISUALIZED ORBITS/SINUSES/MASTOIDS: No intraorbital abnormality. No paranasal sinus mucosal disease. No middle ear or mastoid effusion.    BONES/SOFT TISSUES: No acute abnormality.      Impression    IMPRESSION:  1.  Normal head CT.   CT Cervical Spine w/o Contrast    Narrative    EXAM: CT CERVICAL SPINE W/O CONTRAST  LOCATION: [facility]  DATE: 7/25/2025    INDICATION: Fall, question head impact.  COMPARISON: None.  TECHNIQUE: Routine CT Cervical Spine without IV contrast. Multiplanar reformats. Dose reduction techniques were used.      Impression    IMPRESSION: There is normal alignment of the cervical vertebrae; however, there is reversal of normal cervical lordosis centered at the C3 level. Vertebral body heights of the cervical spine are normal. Craniocervical alignment is normal. No fractures.   There is loss of disc space height and degenerative endplate spurring at C5-C6. Minimal facet arthropathy throughout the cervical spine. Minimal posterior disc bulging at C2-C3, C3-C4 and C4-C5. No spinal canal stenosis. No prevertebral soft tissue   swelling.   CTA GI Bleed    Narrative    EXAM: CTA GI BLEED  LOCATION: [facility]  DATE: 7/25/2025    INDICATION: Hematochezia ongoing with syncopal event  COMPARISON: CT angiogram 7/24/2025  TECHNIQUE: CT angiogram abdomen pelvis during arterial phase of injection of IV contrast. 2D and 3D MIP reconstructions were performed by the CT technologist. Dose reduction techniques were used.  CONTRAST: ISOVUE 370 75ML    FINDINGS:  ANGIOGRAM ABDOMEN/PELVIS: There is no evidence of abdominal aortic aneurysm or dissection. Mild calcified and noncalcified atherosclerosis. The celiac, superior mesenteric, bilateral renal and inferior mesenteric arteries widely patent. No evidence of    hemodynamically significant stenosis in the pelvis. No evidence of active peritoneal or retroperitoneal hemorrhage. Specifically, no evidence of active, intraluminal gastrointestinal hemorrhage.    LOWER CHEST: Unremarkable.    HEPATOBILIARY: Cholelithiasis without evidence of acute cholecystitis or biliary obstruction.    PANCREAS: Normal.    SPLEEN: Multiple calcified splenic granulomas.    ADRENAL GLANDS: Normal.    KIDNEYS/BLADDER: Multiple nonobstructing right renal calculi. No ureterolithiasis or hydronephrosis bilaterally. Urinary bladder is unremarkable.    BOWEL: Fluid is noted throughout the length of the colon. Scattered diverticuli without evidence of acute diverticulitis. No definite evidence of active, intraluminal gastrointestinal hemorrhage. Of note, there are several hyperdense foci in the   descending and sigmoid colon which are unchanged between pre and postcontrast images. No evidence of mechanical bowel obstruction or acute inflammation.    LYMPH NODES: No suspicious lymphadenopathy. No abdominopelvic free fluid.    PELVIC ORGANS: Mild prostatomegaly.    MUSCULOSKELETAL: Minimally displaced, acute-appearing left lateral eighth rib fracture (series 10 image 79). No additional acute bony abnormality.      Impression    IMPRESSION:  1.  Fluid throughout the length of the colon, suggestive of diarrheal illness. No definite active, intraluminal gastrointestinal hemorrhage.  2.  Minimally displaced, acute-appearing left lateral eighth rib fracture.   CTA GI Bleed     Value    Radiologist flags (AA)     Active extravasation from a vessel or major vascular injury    Narrative    EXAM: CTA GI BLEED  LOCATION: [facility]  DATE: 7/27/2025    INDICATION: brpbp w hypotension  COMPARISON: CTA abdomen/pelvis 7/25/2025.  TECHNIQUE: CT angiogram abdomen pelvis during arterial phase of injection of IV contrast. 2D and 3D MIP reconstructions were performed by the CT technologist. Dose  reduction techniques were used.  CONTRAST: 90 ml iso 370    FINDINGS:  ANGIOGRAM ABDOMEN/PELVIS: Normal caliber abdominal aorta. No dissection. Mild calcified atherosclerotic plaque. Origins of the celiac axis, SMA, single bilateral renal arteries, and MILA are widely patent. Normal caliber iliac and visualized femoral   arteries without significant stenosis.    There is active arterial extravasation in the ascending colon (series 6, image 164).    LOWER CHEST: Minimal bilateral pleural fluid and mild bibasilar atelectasis/scarring.    HEPATOBILIARY: Cholelithiasis.    PANCREAS: Normal.    SPLEEN: Normal.    ADRENAL GLANDS: Normal.    KIDNEYS/BLADDER: No hydronephrosis. Nonobstructing right renal calculi. Few renal hypodensities are too small to characterize, for which no dedicated imaging follow-up is required.    BOWEL: No bowel obstruction. Few colonic diverticula without acute diverticulitis. Normal appendix. No ascites or pneumoperitoneum.    LYMPH NODES: Normal.    PELVIC ORGANS: Normal.    MUSCULOSKELETAL: Thoracolumbar spondylosis. No destructive osseous lesion.      Impression    IMPRESSION:  1.  Active arterial extravasation within the ascending colon.  2.  Cholelithiasis.  3.  Nonobstructing right renal calculi.    [Critical Result: Active extravasation from a vessel or major vascular injury]    Finding was identified on 7/27/2025 10:54 AM CDT.     Dr. Talley was contacted by me on 7/27/2025 11:11 AM CDT and verbalized understanding of the critical result.    XR Chest Port 1 View    Narrative    EXAM: XR CHEST PORT 1 VIEW  LOCATION: [facility]  DATE: 7/27/2025    INDICATION: line placement verification  COMPARISON: 9/12/2014      Impression    IMPRESSION: Right IJ sheath likely at the junction of the IJ and innominate veins. No pneumothorax. The lungs are clear.   IR Visceral Angiogram    Narrative    [facility]  LOCATION: [facility]  DATE:  7/27/2025    PROCEDURE:   ULTRASOUND-GUIDED ACCESS INTO THE RIGHT COMMON FEMORAL ARTERY  SUPERIOR MESENTERIC ARTERY CATHETERIZATION AND VENOGRAM   ASCENDING COLIC ARTERY, THIRD ORDER BRANCH OF THE SUPERIOR MESENTERIC ARTERY, CATHETERIZATION, ANGIOGRAPHY, AND COIL EMBOLIZATION  HEPATIC FLEXURE COLIC ARTERY, THIRD ORDER BRANCH OF THE SUPERIOR MESENTERIC ARTERY, CATHETERIZATION, ANGIOGRAPHY, AND COIL EMBOLIZATION  IPSILATERAL RIGHT EXTERNAL ILIAC ARTERY ANGIOGRAM  MODERATE SEDATION  USAGE OF A VASCULAR CLOSURE DEVICE    INTERVENTIONAL RADIOLOGIST: Marv Mcadams MD    INDICATION: The patient is a 70-year-old male with a history of active GI bleeding with extravasation seen on CTA in the hepatic flexure who presents to interventional radiology for mesenteric angiography and possible embolization as needed.    CONSENT: The risks, benefits and alternatives of mesenteric angiogram and possible intervention were discussed with the patient  in detail. All questions were answered. Informed consent was given to proceed with the procedure.    MODERATE SEDATION: Versed 1.5 mg IV; Fentanyl 50 mcg IV. During the time out, immediately prior to the administration of medications, the patient was reassessed for adequacy to receive conscious sedation.  Under physician supervision, Versed and fentanyl   were administered for moderate sedation. Pulse oximetry, heart rate and blood pressure were continuously monitored by an independent trained observer. The physician spent 40 minutes of face-to-face sedation time with the patient.    CONTRAST: 40 cc of Omnipaque 350  ANTIBIOTICS: None.  ADDITIONAL MEDICATIONS: None.    FLUOROSCOPIC TIME: 7.6 minutes.  RADIATION DOSE: Air Kerma: 2652 mGy.    COMPLICATIONS: No immediate complications.    STERILE BARRIER TECHNIQUE: Maximum sterile barrier technique was used. Cutaneous antisepsis was performed at the operative site with application of 2% chlorhexidine and large sterile drape. Prior to the  procedure, the  and assistant performed   hand hygiene and wore hat, mask, sterile gown, and sterile gloves during the entire procedure.    PROCEDURE:  Informed consent was obtained. The patient was placed supine on the fluoroscopy table. Patient was sterilely prepped and draped in normal fashion. Under ultrasound guidance, the right  common femoral artery was identified and appeared patent. An image of   this was saved. A 21-gauge micropuncture needle, again under ultrasound guidance, was advanced into the right  common femoral artery. A 0.018 wire was placed through the needle into the vessel. The needle was exchanged for 3/5 Mauritian coaxial dilator.   The wire and inner 3 Mauritian dilator removed. A 0.035 inch Uppidyson  wire was then placed through the 5 Mauritian coaxial dilator. The dilator was exchanged over the wire for a 5 Mauritian vascular sheath.    Over a 5 Mauritian Cobra catheter, the superior mesenteric artery was catheterized and angiography was performed.    Through the base catheter, a 2.4 Mauritian microcatheter was utilized to select a third order branch of the superior mesenteric artery supplying the ascending colon artery and an angiogram was performed. The microcatheter was further advanced into the   bleeding mucosal branch of the vessel where embolization was performed utilizing a single 2 mm x 3 mm Daniel coil.    The 2.4 Mauritian microcatheter was then utilized to select a third order branch of the superior mesenteric artery supplying the hepatic flexure and an angiogram was performed. The microcatheter was further advanced into the bleeding mucosal branch of the   vessel where embolization was performed utilizing a single 2 mm x 3 mm Daniel coil.    Repeat angiography was performed. Base catheter and microcatheter both removed.    A right external iliac artery angiogram was performed through the side arm of the vascular sheath. The arteriotomy was then closed utilizing a 6 Mauritian VIP Angio-Seal  closure device. Hemostasis was achieved. A clean and sterile dressing was applied. The   patient tolerated the procedure well.    FINDINGS:  Ultrasound demonstrated a widely patent right common femoral artery and appropriate intraluminal needle placement. A permanent image was saved.    Superior mesenteric artery angiography demonstrates expected branching pattern with active bleeding seen in the region of the hepatic flexure correlates with CT findings.    Ascending colic artery angiography confirms active bleeding from a lateral branch. Subselected angiography from the bleeding branch confirms active extravasation. After coil embolization as described above, no further bleeding was seen.    Hepatic flexure colic artery angiography confirms active bleeding from a lateral branch. Subselected angiography from the bleeding branch confirms active extravasation. After coil embolization as described above, no further bleeding was seen.    Ipsilateral right external iliac artery angiogram demonstrates appropriate positioning of the arteriotomy for usage of a vascular closure device. A permanent image was saved.      Impression    IMPRESSION:    1.  Active bleeding from 2 separate mucosal arteries from the ascending colic artery/hepatic flexure which were successfully treated with coil embolization.         Discharge Medications      Review of your medicines        START taking        Dose / Directions   pantoprazole 40 MG EC tablet  Commonly known as: PROTONIX  Used for: Lower GI bleed      Dose: 40 mg  Take 1 tablet (40 mg) by mouth every morning (before breakfast).  Quantity: 30 tablet  Refills: 0            CONTINUE these medicines which have NOT CHANGED        Dose / Directions   Advair Diskus 250-50 MCG/ACT inhaler  Generic drug: fluticasone-salmeterol      Dose: 1 puff  Inhale 1 puff into the lungs 2 times daily.  Refills: 0     Allegra Allergy 180 MG tablet  Generic drug: fexofenadine      Dose: 180 mg  Take 180 mg  by mouth daily.  Refills: 0     atorvastatin 20 MG tablet  Commonly known as: LIPITOR      Dose: 10 mg  Take 10 mg by mouth daily.  Refills: 0     Flonase Allergy Relief 50 MCG/ACT nasal spray  Generic drug: fluticasone      1 spray in each nostril Nasally Once a day for 30 day(s)  Refills: 0     latanoprost 0.005 % ophthalmic solution  Commonly known as: XALATAN      Dose: 1 drop  Place 1 drop into the right eye at bedtime. PLACE 1 DROP INTO RIGHT EYE EVERY EVENING  Refills: 0     Multi Vitamin Tabs      Dose: 1 tablet  Take 1 tablet by mouth daily  Refills: 0     ProAir  (90 Base) MCG/ACT inhaler  Generic drug: albuterol      Dose: 1 puff  Inhale 1 puff into the lungs every 6 hours as needed for shortness of breath.  Refills: 0            STOP taking      Vitamin D (Cholecalciferol) 25 MCG (1000 UT) Caps                  Where to get your medicines        These medications [facility] to [id number] - [place] [address] [place] [address] [address], [facility] [address]      Phone: [phone]   pantoprazole 40 MG EC tablet       Allergies   No Known Allergies

## 2025-08-31 ENCOUNTER — APPOINTMENT (OUTPATIENT)
Dept: RADIOLOGY | Facility: HOSPITAL | Age: 70
End: 2025-08-31
Attending: EMERGENCY MEDICINE
Payer: MEDICARE

## 2025-08-31 ENCOUNTER — HOSPITAL ENCOUNTER (EMERGENCY)
Facility: HOSPITAL | Age: 70
Discharge: HOME | End: 2025-08-31
Attending: EMERGENCY MEDICINE | Admitting: EMERGENCY MEDICINE
Payer: MEDICARE

## 2025-08-31 VITALS
WEIGHT: 189 LBS | TEMPERATURE: 99 F | RESPIRATION RATE: 20 BRPM | DIASTOLIC BLOOD PRESSURE: 65 MMHG | BODY MASS INDEX: 29.66 KG/M2 | HEIGHT: 67 IN | OXYGEN SATURATION: 99 % | HEART RATE: 87 BPM | SYSTOLIC BLOOD PRESSURE: 118 MMHG

## 2025-08-31 DIAGNOSIS — R50.9 FEVER, UNSPECIFIED FEVER CAUSE: Primary | ICD-10-CM

## 2025-08-31 LAB
ALBUMIN SERPL BCG-MCNC: 4 G/DL (ref 3.5–5.2)
ALBUMIN UR-MCNC: NEGATIVE MG/DL
ALP SERPL-CCNC: 138 U/L (ref 40–150)
ALT SERPL W P-5'-P-CCNC: 39 U/L (ref 0–70)
ANION GAP SERPL CALCULATED.3IONS-SCNC: 12 MMOL/L (ref 7–15)
APPEARANCE UR: CLEAR
AST SERPL W P-5'-P-CCNC: 49 U/L (ref 0–45)
BASOPHILS # BLD AUTO: <0.03 10E3/UL (ref 0–0.2)
BASOPHILS NFR BLD AUTO: 0 %
BILIRUB SERPL-MCNC: 0.7 MG/DL
BILIRUB UR QL STRIP: NEGATIVE
BUN SERPL-MCNC: 15.8 MG/DL (ref 8–23)
CALCIUM SERPL-MCNC: 8.9 MG/DL (ref 8.8–10.4)
CHLORIDE SERPL-SCNC: 100 MMOL/L (ref 98–107)
COLOR UR AUTO: NORMAL
CREAT SERPL-MCNC: 1.43 MG/DL (ref 0.67–1.17)
EGFRCR SERPLBLD CKD-EPI 2021: 53 ML/MIN/1.73M2
EOSINOPHIL # BLD AUTO: 0.06 10E3/UL (ref 0–0.7)
EOSINOPHIL NFR BLD AUTO: 1 %
ERYTHROCYTE [DISTWIDTH] IN BLOOD BY AUTOMATED COUNT: 14.8 % (ref 10–15)
FLUAV RNA SPEC QL NAA+PROBE: NEGATIVE
FLUBV RNA RESP QL NAA+PROBE: NEGATIVE
GLUCOSE SERPL-MCNC: 115 MG/DL (ref 70–99)
GLUCOSE UR STRIP-MCNC: NEGATIVE MG/DL
HCO3 SERPL-SCNC: 23 MMOL/L (ref 22–29)
HCT VFR BLD AUTO: 35 % (ref 40–53)
HGB BLD-MCNC: 10.9 G/DL (ref 13.3–17.7)
HGB UR QL STRIP: NEGATIVE
IMM GRANULOCYTES # BLD: 0.03 10E3/UL
IMM GRANULOCYTES NFR BLD: 0.5 %
KETONES UR STRIP-MCNC: NEGATIVE MG/DL
LACTATE SERPL-SCNC: 0.9 MMOL/L (ref 0.7–2)
LEUKOCYTE ESTERASE UR QL STRIP: NEGATIVE
LYMPHOCYTES # BLD AUTO: 0.73 10E3/UL (ref 0.8–5.3)
LYMPHOCYTES NFR BLD AUTO: 12.3 %
MCH RBC QN AUTO: 27.4 PG (ref 26.5–33)
MCHC RBC AUTO-ENTMCNC: 31.1 G/DL (ref 31.5–36.5)
MCV RBC AUTO: 87.9 FL (ref 78–100)
MONOCYTES # BLD AUTO: 0.55 10E3/UL (ref 0–1.3)
MONOCYTES NFR BLD AUTO: 9.3 %
NEUTROPHILS # BLD AUTO: 4.55 10E3/UL (ref 1.6–8.3)
NEUTROPHILS NFR BLD AUTO: 76.9 %
NITRATE UR QL: NEGATIVE
NRBC # BLD AUTO: <0.03 10E3/UL
NRBC BLD AUTO-RTO: 0 /100
PH UR STRIP: 6 [PH] (ref 5–7)
PLATELET # BLD AUTO: 248 10E3/UL (ref 150–450)
POTASSIUM SERPL-SCNC: 3.8 MMOL/L (ref 3.4–5.3)
PROCALCITONIN SERPL IA-MCNC: 0.07 NG/ML
PROT SERPL-MCNC: 7.3 G/DL (ref 6.4–8.3)
RBC # BLD AUTO: 3.98 10E6/UL (ref 4.4–5.9)
RBC URINE: 1 /HPF
RSV RNA SPEC NAA+PROBE: NEGATIVE
SARS-COV-2 RNA RESP QL NAA+PROBE: NEGATIVE
SODIUM SERPL-SCNC: 135 MMOL/L (ref 135–145)
SP GR UR STRIP: 1.02 (ref 1–1.03)
SQUAMOUS EPITHELIAL: <1 /HPF
UROBILINOGEN UR STRIP-MCNC: NORMAL MG/DL
WBC # BLD AUTO: 5.92 10E3/UL (ref 4–11)
WBC URINE: <1 /HPF

## 2025-08-31 PROCEDURE — 250N000013 HC RX MED GY IP 250 OP 250 PS 637: Performed by: EMERGENCY MEDICINE

## 2025-08-31 PROCEDURE — 83605 ASSAY OF LACTIC ACID: CPT | Performed by: EMERGENCY MEDICINE

## 2025-08-31 PROCEDURE — 82040 ASSAY OF SERUM ALBUMIN: CPT | Performed by: EMERGENCY MEDICINE

## 2025-08-31 PROCEDURE — 87040 BLOOD CULTURE FOR BACTERIA: CPT | Performed by: EMERGENCY MEDICINE

## 2025-08-31 PROCEDURE — 85025 COMPLETE CBC W/AUTO DIFF WBC: CPT | Performed by: EMERGENCY MEDICINE

## 2025-08-31 PROCEDURE — 71046 X-RAY EXAM CHEST 2 VIEWS: CPT

## 2025-08-31 PROCEDURE — 84145 PROCALCITONIN (PCT): CPT | Performed by: EMERGENCY MEDICINE

## 2025-08-31 PROCEDURE — 81001 URINALYSIS AUTO W/SCOPE: CPT | Performed by: EMERGENCY MEDICINE

## 2025-08-31 PROCEDURE — 99284 EMERGENCY DEPT VISIT MOD MDM: CPT | Mod: 25 | Performed by: EMERGENCY MEDICINE

## 2025-08-31 PROCEDURE — 36415 COLL VENOUS BLD VENIPUNCTURE: CPT | Performed by: EMERGENCY MEDICINE

## 2025-08-31 PROCEDURE — 87637 SARSCOV2&INF A&B&RSV AMP PRB: CPT | Performed by: EMERGENCY MEDICINE

## 2025-08-31 RX ORDER — ACETAMINOPHEN 325 MG/1
975 TABLET ORAL ONCE
Status: COMPLETED | OUTPATIENT
Start: 2025-08-31 | End: 2025-08-31

## 2025-08-31 RX ADMIN — ACETAMINOPHEN 975 MG: 325 TABLET ORAL at 15:27

## 2025-08-31 ASSESSMENT — COLUMBIA-SUICIDE SEVERITY RATING SCALE - C-SSRS
6. HAVE YOU EVER DONE ANYTHING, STARTED TO DO ANYTHING, OR PREPARED TO DO ANYTHING TO END YOUR LIFE?: NO
1. IN THE PAST MONTH, HAVE YOU WISHED YOU WERE DEAD OR WISHED YOU COULD GO TO SLEEP AND NOT WAKE UP?: NO
2. HAVE YOU ACTUALLY HAD ANY THOUGHTS OF KILLING YOURSELF IN THE PAST MONTH?: NO

## 2025-08-31 ASSESSMENT — ACTIVITIES OF DAILY LIVING (ADL)
ADLS_ACUITY_SCORE: 59

## 2025-09-04 LAB
BACTERIA SPEC CULT: NORMAL
BACTERIA SPEC CULT: NORMAL

## (undated) DEVICE — SOLUTION WATER 1000ML BOTTLE R5000-01

## (undated) DEVICE — TUBING SUCTION MEDI-VAC 1/4"X20' N620A

## (undated) DEVICE — SUCTION MANIFOLD NEPTUNE 2 SYS 1 PORT 702-025-000

## (undated) RX ORDER — FENTANYL CITRATE 50 UG/ML
INJECTION, SOLUTION INTRAMUSCULAR; INTRAVENOUS
Status: DISPENSED
Start: 2025-07-27

## (undated) RX ORDER — PHENYLEPHRINE HYDROCHLORIDE 10 MG/ML
INJECTION INTRAVENOUS
Status: DISPENSED
Start: 2025-07-26

## (undated) RX ORDER — PROPOFOL 10 MG/ML
INJECTION, EMULSION INTRAVENOUS
Status: DISPENSED
Start: 2025-07-26

## (undated) RX ORDER — LIDOCAINE HYDROCHLORIDE 10 MG/ML
INJECTION, SOLUTION INFILTRATION; PERINEURAL
Status: DISPENSED
Start: 2025-07-27